# Patient Record
Sex: FEMALE | Race: WHITE
[De-identification: names, ages, dates, MRNs, and addresses within clinical notes are randomized per-mention and may not be internally consistent; named-entity substitution may affect disease eponyms.]

---

## 2021-06-30 ENCOUNTER — HOSPITAL ENCOUNTER (OUTPATIENT)
Dept: HOSPITAL 79 - US | Age: 56
End: 2021-06-30
Attending: INTERNAL MEDICINE
Payer: COMMERCIAL

## 2021-06-30 DIAGNOSIS — R74.8: Primary | ICD-10-CM

## 2021-06-30 DIAGNOSIS — K76.9: ICD-10-CM

## 2021-07-12 ENCOUNTER — HOSPITAL ENCOUNTER (EMERGENCY)
Dept: HOSPITAL 79 - ER1 | Age: 56
Discharge: HOME | End: 2021-07-12
Payer: COMMERCIAL

## 2021-07-12 DIAGNOSIS — R10.9: Primary | ICD-10-CM

## 2021-07-12 DIAGNOSIS — Z91.041: ICD-10-CM

## 2021-07-12 DIAGNOSIS — Z90.49: ICD-10-CM

## 2021-07-12 DIAGNOSIS — I10: ICD-10-CM

## 2021-07-12 DIAGNOSIS — Z90.710: ICD-10-CM

## 2021-07-12 LAB
BUN/CREATININE RATIO: 12 (ref 0–10)
HGB BLD-MCNC: 14.4 GM/DL (ref 12.3–15.3)
RED BLOOD COUNT: 5.11 M/UL (ref 4–5.1)
WHITE BLOOD COUNT: 16.6 K/UL (ref 4.5–11)

## 2021-07-28 ENCOUNTER — HOSPITAL ENCOUNTER (OUTPATIENT)
Dept: HOSPITAL 79 - OR | Age: 56
Discharge: HOME | End: 2021-07-28
Attending: INTERNAL MEDICINE
Payer: COMMERCIAL

## 2021-07-28 DIAGNOSIS — R19.7: ICD-10-CM

## 2021-07-28 DIAGNOSIS — Z79.899: ICD-10-CM

## 2021-07-28 DIAGNOSIS — K29.50: ICD-10-CM

## 2021-07-28 DIAGNOSIS — E11.9: ICD-10-CM

## 2021-07-28 DIAGNOSIS — E66.3: ICD-10-CM

## 2021-07-28 DIAGNOSIS — K64.0: ICD-10-CM

## 2021-07-28 DIAGNOSIS — Z90.710: ICD-10-CM

## 2021-07-28 DIAGNOSIS — K22.70: ICD-10-CM

## 2021-07-28 DIAGNOSIS — Z87.442: ICD-10-CM

## 2021-07-28 DIAGNOSIS — D68.9: ICD-10-CM

## 2021-07-28 DIAGNOSIS — G25.81: ICD-10-CM

## 2021-07-28 DIAGNOSIS — Z20.822: ICD-10-CM

## 2021-07-28 DIAGNOSIS — I10: ICD-10-CM

## 2021-07-28 DIAGNOSIS — K31.9: Primary | ICD-10-CM

## 2021-07-28 DIAGNOSIS — K25.9: ICD-10-CM

## 2021-07-28 DIAGNOSIS — K63.89: ICD-10-CM

## 2021-07-28 DIAGNOSIS — R91.1: ICD-10-CM

## 2021-07-28 DIAGNOSIS — J30.9: ICD-10-CM

## 2021-07-28 DIAGNOSIS — M81.0: ICD-10-CM

## 2021-07-28 DIAGNOSIS — Z91.041: ICD-10-CM

## 2021-07-28 DIAGNOSIS — K21.00: ICD-10-CM

## 2021-07-30 ENCOUNTER — HOSPITAL ENCOUNTER (OUTPATIENT)
Dept: HOSPITAL 79 - CT | Age: 56
End: 2021-07-30
Attending: INTERNAL MEDICINE
Payer: COMMERCIAL

## 2021-07-30 DIAGNOSIS — K76.0: ICD-10-CM

## 2021-07-30 DIAGNOSIS — K76.9: Primary | ICD-10-CM

## 2021-12-16 ENCOUNTER — TELEPHONE (OUTPATIENT)
Dept: NEUROLOGY | Facility: OTHER | Age: 56
End: 2021-12-16

## 2021-12-16 NOTE — TELEPHONE ENCOUNTER
GRACIE CALLED FOR BOTOX DELIVERY AND CLARIFICATION.    CALL TRANSFERRED TO DONALDO ON CLINICAL LINE

## 2022-02-08 ENCOUNTER — HOSPITAL ENCOUNTER (OUTPATIENT)
Dept: HOSPITAL 79 - KOH-I | Age: 57
End: 2022-02-08
Attending: INTERNAL MEDICINE
Payer: COMMERCIAL

## 2022-02-08 DIAGNOSIS — R22.9: Primary | ICD-10-CM

## 2022-03-24 ENCOUNTER — HOSPITAL ENCOUNTER (OUTPATIENT)
Dept: HOSPITAL 79 - OPSV | Age: 57
End: 2022-03-24
Attending: INTERNAL MEDICINE
Payer: COMMERCIAL

## 2022-03-24 VITALS — BODY MASS INDEX: 29.99 KG/M2 | HEIGHT: 65 IN | WEIGHT: 180 LBS

## 2022-03-24 DIAGNOSIS — M81.0: Primary | ICD-10-CM

## 2022-04-26 ENCOUNTER — HOSPITAL ENCOUNTER (OUTPATIENT)
Dept: HOSPITAL 79 - OR | Age: 57
Discharge: HOME | End: 2022-04-26
Attending: INTERNAL MEDICINE
Payer: COMMERCIAL

## 2022-04-26 DIAGNOSIS — Z91.041: ICD-10-CM

## 2022-04-26 DIAGNOSIS — K29.50: ICD-10-CM

## 2022-04-26 DIAGNOSIS — K90.0: Primary | ICD-10-CM

## 2022-04-26 DIAGNOSIS — K22.89: ICD-10-CM

## 2022-04-26 DIAGNOSIS — E66.3: ICD-10-CM

## 2022-04-26 DIAGNOSIS — K22.70: ICD-10-CM

## 2022-04-26 DIAGNOSIS — Z79.82: ICD-10-CM

## 2022-04-26 DIAGNOSIS — K21.00: ICD-10-CM

## 2022-04-26 DIAGNOSIS — Z79.899: ICD-10-CM

## 2022-04-26 DIAGNOSIS — I10: ICD-10-CM

## 2022-04-26 DIAGNOSIS — Z88.8: ICD-10-CM

## 2022-04-26 DIAGNOSIS — M19.90: ICD-10-CM

## 2022-04-26 DIAGNOSIS — E11.9: ICD-10-CM

## 2022-04-26 DIAGNOSIS — E78.5: ICD-10-CM

## 2022-04-26 DIAGNOSIS — Z90.49: ICD-10-CM

## 2022-06-08 ENCOUNTER — HOSPITAL ENCOUNTER (OUTPATIENT)
Dept: HOSPITAL 79 - NM | Age: 57
End: 2022-06-08
Attending: INTERNAL MEDICINE
Payer: COMMERCIAL

## 2022-06-08 DIAGNOSIS — K31.89: Primary | ICD-10-CM

## 2022-06-08 PROCEDURE — A9541 TC99M SULFUR COLLOID: HCPCS

## 2023-10-04 ENCOUNTER — DISEASE STATE MANAGEMENT VISIT (OUTPATIENT)
Dept: PHARMACY | Facility: HOSPITAL | Age: 58
End: 2023-10-04
Payer: MEDICAID

## 2023-10-04 VITALS
DIASTOLIC BLOOD PRESSURE: 74 MMHG | OXYGEN SATURATION: 97 % | TEMPERATURE: 97.3 F | HEIGHT: 67 IN | SYSTOLIC BLOOD PRESSURE: 118 MMHG | HEART RATE: 84 BPM | WEIGHT: 176 LBS | BODY MASS INDEX: 27.62 KG/M2

## 2023-10-04 DIAGNOSIS — G43.719 INTRACTABLE CHRONIC MIGRAINE WITHOUT AURA AND WITHOUT STATUS MIGRAINOSUS: Primary | ICD-10-CM

## 2023-10-04 PROCEDURE — 25010000002 ONABOTULINUMTOXINA 200 UNITS RECONSTITUTED SOLUTION: Performed by: NURSE PRACTITIONER

## 2023-10-04 RX ORDER — GABAPENTIN 400 MG/1
400 CAPSULE ORAL
COMMUNITY

## 2023-10-04 RX ADMIN — ONABOTULINUMTOXINA 165 UNITS: 200 INJECTION, POWDER, LYOPHILIZED, FOR SOLUTION INTRADERMAL; INTRAMUSCULAR at 14:21

## 2023-10-04 NOTE — PROGRESS NOTES
Botox Procedure Note       Patient Name: Esme Franklin  : 1965   MRN: [id number]     Chief Complaint:  Here for Botox injections and the Botox is facility supplied (will be billed by the hospital).    History of Present Illness: Esme Franklin is a 58 y.o. female who is here today for Botox injections for migraines and she has had 90-95% improvement with Botox injections.     We have discussed risk and benefits of this Botox procedure and common side effects including headache, neck pain, neck stiffness or weakness, ptosis, flu-like symptoms as well as more serious possible adverse effects including possible dysphagia, respiratory distress or even death (death has only been reported once with adults for Botox [place] mig[place]ines [place] when mixed with lidocaine solution which we do not use lidocaine solution in our practice for mixing Botox). The patient verbally understands and accepts risks and agrees with moving forward with Botox injections for chronic migraine prevention.    Verbal and written consent has been obtained from the patient prior to beginning treatment injections.     Subjective      Review of Systems:   Review of Systems    The following portions of the patient's history were reviewed an updated as appropriate: past family history, past medical history, past social history, past surgical history.     Medications:     Current Outpatient Medications:     aspirin 81 MG EC tablet, Take 1 tablet by mouth Daily., Disp: , Rfl:     B-D UF III MINI PEN NEEDLES 31G X 5 MM misc, , Disp: , Rfl:     Continuous Blood Gluc Sensor (Dexcom G6 Sensor), AS DIRECTED CHANGE SENSOR EVERY 10 DAYS, Disp: , Rfl:     Continuous Blood Gluc Transmit (Dexcom G6 Transmitter) misc, , Disp: , Rfl:     dilTIAZem (TIAZAC) 180 MG 24 hr capsule, Take 1 capsule by mouth Daily., Disp: , Rfl:     Farxiga 10 MG tablet, , Disp: , Rfl:     folic acid (FOLVITE) 1 MG tablet, Take 1 tablet by mouth Daily., Disp: , Rfl:      "gabapentin (NEURONTIN) 400 MG capsule, Take 1 capsule by mouth every night at bedtime., Disp: , Rfl:     Linzess 72 MCG capsule capsule, , Disp: , Rfl:     lisinopril (PRINIVIL,ZESTRIL) 40 MG tablet, , Disp: , Rfl:     omega-3 acid ethyl esters (LOVAZA) 1 g capsule, , Disp: , Rfl:     pantoprazole (PROTONIX) 40 MG EC tablet, , Disp: , Rfl:     prochlorperazine (COMPAZINE) 5 MG tablet, Take 1 tablet by mouth Every 8 (Eight) Hours As Needed for Nausea or Vomiting., Disp: 30 tablet, Rfl: 5    pyridostigmine (MESTINON) 60 MG tablet, , Disp: , Rfl:     Rimegepant Sulfate (Nurtec) 75 MG tablet dispersible tablet, Place 1 tablet under the tongue Take As Directed., Disp: 16 tablet, Rfl: 11    Rimegepant Sulfate (Nurtec) 75 MG tablet dispersible tablet, Take 1 tablet by mouth As Needed (as needed)., Disp: 6 tablet, Rfl: 0    rOPINIRole (REQUIP) 1 MG tablet, , Disp: , Rfl:     rosuvastatin (CRESTOR) 20 MG tablet, , Disp: , Rfl:     Scopolamine 1 MG/3DAYS patch, , Disp: , Rfl:     Dulaglutide (Trulicity) 4.5 MG/0.5ML solution pen-injector, Inject 4.5 mg under the skin into the appropriate area as directed Every 7 (Seven) Days. (Patient not taking: Reported on 10/4/2023), Disp: , Rfl:     Current Facility-Administered Medications:     OnabotulinumtoxinA 200 Units, 200 Units, Intramuscular, Q3 Months, Sonia Camacho, APRN, 165 Units at 10/04/23 1421    Allergies:   Allergies   Allergen Reactions    Contrast Dye (Echo Or Unknown Ct/Mr) Anaphylaxis    Lyrica [Pregabalin] Anaphylaxis    Influenza Virus Vaccine Unknown - Low Severity     SITE SWELLING AND REDNESS    Other Other (See Comments)    Zonisamide Unknown - High Severity       Objective     Physical Exam:  Vital Signs:   Vitals:    10/04/23 1349   BP: 118/74   BP Location: Right arm   Patient Position: Sitting   Cuff Size: Adult   Pulse: 84   Temp: 97.3 °F (36.3 °C)   SpO2: 97%   Weight: 79.8 kg (176 lb)   Height: 170.2 cm (67\")   PainSc:   4   PainLoc: " Head  Comment: knee     Body mass index is 27.57 kg/m².     Physical Exam    Neurologic Exam    BOTOX PROCEDURE:     Date of Last Injection:   Response: Good  Side Effects: None  : Allerggale  Lot #: [id number]  Expiration Date: 02/2026  NDC: 0817783577    200 unit vial Botox was re-constituted with 4 mL 0.9 NS to 5 unit/0.1 mL using standard techniques.  10 units were given at the  muscle in 2 divided sites  5 units were given at the Procerus muscle  20 units were given at the Frontalis muscle in 4 divided sites  40 units were given at the Temporalis muscle in 8 divided sites  30 units were given at the Occipitalis muscle in 6 divided sites  20 units were given at the Cervical paraspinal muscle in 4 divided sites  30 units were given at the Trapezius muscle in 6 divided sites  10 units were given at the Masseter muscle in 2 divided sites.   For a total of 165 units divided between 33 sites and 35 units of wastage    Patient tolerated procedure well with no immediate complications.     Assessment / Plan      Assessment/Plan:   Diagnoses and all orders for this visit:    1. Intractable chronic migraine without aura and without status migrainosus (Primary)         Follow Up:   It has been determined that Esme Franklin has chronic migraine headaches. We will proceed with a 12 week regimen of 200 units of Botox injections, to treat the patient's chronic migraines.      Return in about 3 months (around 1/4/2024) for Botox.      AROLDO Bustos, FNP-C  [facility]

## 2023-12-26 ENCOUNTER — TELEPHONE (OUTPATIENT)
Dept: NEUROLOGY | Facility: CLINIC | Age: 58
End: 2023-12-26

## 2024-01-10 ENCOUNTER — DISEASE STATE MANAGEMENT VISIT (OUTPATIENT)
Dept: PHARMACY | Facility: HOSPITAL | Age: 59
End: 2024-01-10
Payer: MEDICAID

## 2024-01-10 VITALS
WEIGHT: 178 LBS | HEIGHT: 67 IN | OXYGEN SATURATION: 96 % | BODY MASS INDEX: 27.94 KG/M2 | TEMPERATURE: 97.1 F | HEART RATE: 84 BPM

## 2024-01-10 DIAGNOSIS — G43.719 INTRACTABLE CHRONIC MIGRAINE WITHOUT AURA AND WITHOUT STATUS MIGRAINOSUS: Primary | ICD-10-CM

## 2024-01-10 PROCEDURE — 25010000002 ONABOTULINUMTOXINA 200 UNITS RECONSTITUTED SOLUTION: Performed by: NURSE PRACTITIONER

## 2024-01-10 PROCEDURE — 64615 CHEMODENERV MUSC MIGRAINE: CPT | Performed by: NURSE PRACTITIONER

## 2024-01-10 RX ORDER — TIRZEPATIDE 10 MG/.5ML
INJECTION, SOLUTION SUBCUTANEOUS
COMMUNITY
Start: 2023-12-12

## 2024-01-10 RX ADMIN — ONABOTULINUMTOXINA 165 UNITS: 200 INJECTION, POWDER, LYOPHILIZED, FOR SOLUTION INTRADERMAL; INTRAMUSCULAR at 09:51

## 2024-01-10 NOTE — PROGRESS NOTES
Botox Procedure Note       Patient Name: Esme Santos  : 1965   MRN: [id number]     Chief Complaint:  Here for Botox injections and the Botox is facility supplied (will be billed by the hospital).      History of Present Illness: Esme Santos is a 58 y.o. female who is here today for Botox injections for migraines and has had 100% improvement in migraines with Botox injections.     We have discussed risk and benefits of this Botox procedure and common side effects including headache, neck pain, neck stiffness or weakness, ptosis, flu-like symptoms as well as more serious possible adverse effects including possible dysphagia, respiratory distress or even death (death has only been reported once with adults for Botox for mig[place]ines [place] when mixed with lidocaine solution which we do not use lidocaine solution in our practice for mixing Botox). The patient verbally understands and accepts risks and agrees with moving forward with Botox injections for chronic migraine prevention.    Verbal and written consent has been obtained from the patient prior to beginning treatment injections.     Subjective      Review of Systems:   Review of Systems    The following portions of the patient's history were reviewed an updated as appropriate: past family history, past medical history, past social history, past surgical history.     Medications:     Current Outpatient Medications:     aspirin 81 MG EC tablet, Take 1 tablet by mouth Daily., Disp: , Rfl:     B-D UF III MINI PEN NEEDLES 31G X 5 MM misc, , Disp: , Rfl:     Continuous Blood Gluc Sensor (Dexcom G6 Sensor), AS DIRECTED CHANGE SENSOR EVERY 10 DAYS, Disp: , Rfl:     Continuous Blood Gluc Transmit (Dexcom G6 Transmitter) misc, , Disp: , Rfl:     dilTIAZem (TIAZAC) 180 MG 24 hr capsule, Take 1 capsule by mouth Daily., Disp: , Rfl:     Farxiga 10 MG tablet, , Disp: , Rfl:     folic acid (FOLVITE) 1 MG tablet, Take 1 tablet by mouth Daily., Disp: ,  "Rfl:     gabapentin (NEURONTIN) 400 MG capsule, Take 1 capsule by mouth every night at bedtime., Disp: , Rfl:     Linzess 72 MCG capsule capsule, , Disp: , Rfl:     lisinopril (PRINIVIL,ZESTRIL) 40 MG tablet, , Disp: , Rfl:     Mounjaro 10 MG/0.5ML solution pen-injector pen, , Disp: , Rfl:     omega-3 acid ethyl esters (LOVAZA) 1 g capsule, , Disp: , Rfl:     pantoprazole (PROTONIX) 40 MG EC tablet, , Disp: , Rfl:     prochlorperazine (COMPAZINE) 5 MG tablet, Take 1 tablet by mouth Every 8 (Eight) Hours As Needed for Nausea or Vomiting., Disp: 30 tablet, Rfl: 5    pyridostigmine (MESTINON) 60 MG tablet, , Disp: , Rfl:     Rimegepant Sulfate (Nurtec) 75 MG tablet dispersible tablet, Place 1 tablet under the tongue Take As Directed., Disp: 16 tablet, Rfl: 11    Rimegepant Sulfate (Nurtec) 75 MG tablet dispersible tablet, Take 1 tablet by mouth As Needed (as needed)., Disp: 6 tablet, Rfl: 0    rOPINIRole (REQUIP) 1 MG tablet, , Disp: , Rfl:     rosuvastatin (CRESTOR) 20 MG tablet, , Disp: , Rfl:     Scopolamine 1 MG/3DAYS patch, , Disp: , Rfl:     Current Facility-Administered Medications:     OnabotulinumtoxinA 200 Units, 200 Units, Intramuscular, Q3 Months, Sonia Camacho, AROLDO, 165 Units at 01/10/24 0951    Allergies:   Allergies   Allergen Reactions    Contrast Dye (Echo Or Unknown Ct/Mr) Anaphylaxis    Lyrica [Pregabalin] Anaphylaxis    Influenza Virus Vaccine Unknown - Low Severity     SITE SWELLING AND REDNESS    Other Other (See Comments)    Zonisamide Unknown - High Severity       Objective     Physical Exam:  Vital Signs:   Vitals:    01/10/24 0939   Pulse: 84   Temp: 97.1 °F (36.2 °C)   SpO2: 96%   Weight: 80.7 kg (178 lb)   Height: 170.2 cm (67\")   PainSc: 0-No pain     Body mass index is 27.88 kg/m².     Physical Exam    Neurologic Exam    BOTOX PROCEDURE:     Date of Last Injection:   Response: good  Side Effects: none  : Allergan  Lot #: [id number]  Expiration Date: 05/2026  NDC: " [id number]    200 unit vial Botox was re-constituted with 4 mL 0.9 NS to 5 unit/0.1 mL using standard techniques.  10 units were given at the  muscle in 2 divided sites  5 units were given at the Procerus muscle  20 units were given at the Frontalis muscle in 4 divided sites  40 units were given at the Temporalis muscle in 8 divided sites  30 units were given at the Occipitalis muscle in 6 divided sites  20 units were given at the Cervical paraspinal muscle in 4 divided sites  30 units were given at the Trapezius muscle in 6 divided sites  10 units were given at the Masseter muscle in 2 divided sites.   For a total of 165 units divided between 33 sites and 35 units of wastage    Patient tolerated procedure well with no immediate complications.     Assessment / Plan      Assessment/Plan:   Diagnoses and all orders for this visit:    1. Intractable chronic migraine without aura and without status migrainosus (Primary)         Follow Up:   It has been determined that Esme Santos has chronic migraine headaches. We will proceed with a 12 week regimen of 200 units of Botox injections, to treat the patient's chronic migraines.      Return in about 3 months (around 4/10/2024) for Botox.      AROLDO Bustos, FNP-C  [facility]

## 2024-04-03 ENCOUNTER — DISEASE STATE MANAGEMENT VISIT (OUTPATIENT)
Dept: PHARMACY | Facility: HOSPITAL | Age: 59
End: 2024-04-03
Payer: MEDICAID

## 2024-04-03 VITALS
HEART RATE: 87 BPM | OXYGEN SATURATION: 96 % | HEIGHT: 67 IN | SYSTOLIC BLOOD PRESSURE: 147 MMHG | TEMPERATURE: 97.1 F | DIASTOLIC BLOOD PRESSURE: 88 MMHG | WEIGHT: 188 LBS | BODY MASS INDEX: 29.51 KG/M2

## 2024-04-03 DIAGNOSIS — G43.719 INTRACTABLE CHRONIC MIGRAINE WITHOUT AURA AND WITHOUT STATUS MIGRAINOSUS: Primary | ICD-10-CM

## 2024-04-03 DIAGNOSIS — G43.909 ACUTE MIGRAINE: ICD-10-CM

## 2024-04-03 PROCEDURE — 25010000002 ONABOTULINUMTOXINA 200 UNITS RECONSTITUTED SOLUTION: Performed by: NURSE PRACTITIONER

## 2024-04-03 RX ORDER — RIMEGEPANT SULFATE 75 MG/75MG
75 TABLET, ORALLY DISINTEGRATING ORAL DAILY PRN
Qty: 6 TABLET | Refills: 0 | COMMUNITY
Start: 2024-04-03

## 2024-04-03 RX ORDER — RIMEGEPANT SULFATE 75 MG/75MG
75 TABLET, ORALLY DISINTEGRATING ORAL ONCE AS NEEDED
Qty: 10 TABLET | Refills: 11 | Status: SHIPPED | OUTPATIENT
Start: 2024-04-03

## 2024-04-03 RX ADMIN — ONABOTULINUMTOXINA 165 UNITS: 200 INJECTION, POWDER, LYOPHILIZED, FOR SOLUTION INTRADERMAL; INTRAMUSCULAR at 14:49

## 2024-04-03 NOTE — PROGRESS NOTES
Botox Procedure Note       Patient Name: Esme Santos  : 1965   MRN: [id number]     Chief Complaint:  Here for Botox injections and the Botox is facility supplied (will be billed by the hospital).      History of Present Illness: Esme Santos is a 58 y.o. female who is here today for Botox injections for migraines and she has had 100% improvement in her migraines with Botox injections.  She is taking Nurtec 75mg PRN and it works well for her- needs a prescription.     We have discussed risk and benefits of this Botox procedure and common side effects including headache, neck pain, neck stiffness or weakness, ptosis, flu-like symptoms as well as more serious possible adverse effects including possible dysphagia, respiratory distress or even death (death has only been reported once with adults for Botox [place] mig[place]ines [place] when mixed with lidocaine solution which we do not use lidocaine solution in our practice for mixing Botox). The patient verbally understands and accepts risks and agrees with moving forward with Botox injections for chronic migraine prevention.    Verbal and written consent has been obtained from the patient prior to beginning treatment injections.     Subjective      Review of Systems:   Review of Systems    The following portions of the patient's history were reviewed an updated as appropriate: past family history, past medical history, past social history, past surgical history.     Medications:     Current Outpatient Medications:     aspirin 81 MG EC tablet, Take 1 tablet by mouth Daily., Disp: , Rfl:     B-D UF III MINI PEN NEEDLES 31G X 5 MM misc, , Disp: , Rfl:     Continuous Blood Gluc Sensor (Dexcom G6 Sensor), AS DIRECTED CHANGE SENSOR EVERY 10 DAYS, Disp: , Rfl:     Continuous Blood Gluc Transmit (Dexcom G6 Transmitter) misc, , Disp: , Rfl:     dilTIAZem (TIAZAC) 180 MG 24 hr capsule, Take 1 capsule by mouth Daily., Disp: , Rfl:     Farxiga 10 MG tablet, , Disp:  ", Rfl:     folic acid (FOLVITE) 1 MG tablet, Take 1 tablet by mouth Daily., Disp: , Rfl:     gabapentin (NEURONTIN) 400 MG capsule, Take 1 capsule by mouth every night at bedtime., Disp: , Rfl:     Linzess 72 MCG capsule capsule, , Disp: , Rfl:     lisinopril (PRINIVIL,ZESTRIL) 40 MG tablet, , Disp: , Rfl:     Mounjaro 10 MG/0.5ML solution pen-injector pen, , Disp: , Rfl:     omega-3 acid ethyl esters (LOVAZA) 1 g capsule, , Disp: , Rfl:     pantoprazole (PROTONIX) 40 MG EC tablet, , Disp: , Rfl:     prochlorperazine (COMPAZINE) 5 MG tablet, Take 1 tablet by mouth Every 8 (Eight) Hours As Needed for Nausea or Vomiting., Disp: 30 tablet, Rfl: 5    pyridostigmine (MESTINON) 60 MG tablet, , Disp: , Rfl:     Rimegepant Sulfate (Nurtec) 75 MG tablet dispersible tablet, Take 1 tablet by mouth As Needed (as needed)., Disp: 6 tablet, Rfl: 0    Rimegepant Sulfate (Nurtec) 75 MG tablet dispersible tablet, Place 1 tablet under the tongue 1 (One) Time As Needed (migraine)., Disp: 10 tablet, Rfl: 11    rOPINIRole (REQUIP) 1 MG tablet, , Disp: , Rfl:     rosuvastatin (CRESTOR) 20 MG tablet, , Disp: , Rfl:     Scopolamine 1 MG/3DAYS patch, , Disp: , Rfl:     Current Facility-Administered Medications:     OnabotulinumtoxinA 200 Units, 200 Units, Intramuscular, Q3 Months, Sonia Camacho, AROLDO, 165 Units at 01/10/24 0951    Allergies:   Allergies   Allergen Reactions    Contrast Dye (Echo Or Unknown Ct/Mr) Anaphylaxis    Lyrica [Pregabalin] Anaphylaxis    Influenza Virus Vaccine Unknown - Low Severity     SITE SWELLING AND REDNESS    Other Other (See Comments)    Zonisamide Unknown - High Severity       Objective     Physical Exam:  Vital Signs:   Vitals:    04/03/24 1427   BP: 147/88   BP Location: Right arm   Patient Position: Sitting   Cuff Size: Adult   Pulse: 87   Temp: 97.1 °F (36.2 °C)   SpO2: 96%   Weight: 85.3 kg (188 lb)   Height: 170.2 cm (67\")   PainSc: 0-No pain     Body mass index is 29.44 kg/m².     Physical " Exam    Neurologic Exam    BOTOX PROCEDURE:     Date of Last Injection:   Response: Good  Side Effects: None  : Deonna  Lot #: [id number]  Expiration Date: 06/01/2026  NDC: 8518708035    200 unit vial Botox was re-constituted with 4 mL 0.9 NS to 5 unit/0.1 mL using standard techniques.  10 units were given at the  muscle in 2 divided sites  5 units were given at the Procerus muscle  20 units were given at the Frontalis muscle in 4 divided sites  40 units were given at the Temporalis muscle in 8 divided sites  30 units were given at the Occipitalis muscle in 6 divided sites  20 units were given at the Cervical paraspinal muscle in 4 divided sites  30 units were given at the Trapezius muscle in 6 divided sites  10 units were given at the Masseter muscle in 2 divided sites.   For a total of 165 units divided between 33 sites and 35 units of wastage    Patient tolerated procedure well with no immediate complications.     Assessment / Plan      Assessment/Plan:   Diagnoses and all orders for this visit:    1. Intractable chronic migraine without aura and without status migrainosus (Primary)    2. Acute migraine  -     Rimegepant Sulfate (Nurtec) 75 MG tablet dispersible tablet; Place 1 tablet under the tongue 1 (One) Time As Needed (migraine).  Dispense: 10 tablet; Refill: 11         Follow Up:   It has been determined that Esme Santos has chronic migraine headaches. We will proceed with a 12 week regimen of 200 units of Botox injections, to treat the patient's chronic migraines.      Return in about 3 months (around 7/3/2024) for Botox.      AROLDO Bustos, FNP-C  [facility]

## 2024-06-03 ENCOUNTER — OFFICE VISIT (OUTPATIENT)
Dept: NEUROLOGY | Facility: CLINIC | Age: 59
End: 2024-06-03
Payer: MEDICAID

## 2024-06-03 VITALS
OXYGEN SATURATION: 96 % | BODY MASS INDEX: 29.1 KG/M2 | SYSTOLIC BLOOD PRESSURE: 122 MMHG | WEIGHT: 185.4 LBS | DIASTOLIC BLOOD PRESSURE: 84 MMHG | TEMPERATURE: 97.1 F | HEART RATE: 86 BPM | HEIGHT: 67 IN

## 2024-06-03 DIAGNOSIS — G43.719 INTRACTABLE CHRONIC MIGRAINE WITHOUT AURA AND WITHOUT STATUS MIGRAINOSUS: Primary | ICD-10-CM

## 2024-06-03 DIAGNOSIS — G43.009 MIGRAINE WITHOUT AURA AND WITHOUT STATUS MIGRAINOSUS, NOT INTRACTABLE: ICD-10-CM

## 2024-06-03 PROCEDURE — 1159F MED LIST DOCD IN RCRD: CPT | Performed by: NURSE PRACTITIONER

## 2024-06-03 PROCEDURE — 99213 OFFICE O/P EST LOW 20 MIN: CPT | Performed by: NURSE PRACTITIONER

## 2024-06-03 PROCEDURE — 1160F RVW MEDS BY RX/DR IN RCRD: CPT | Performed by: NURSE PRACTITIONER

## 2024-06-03 RX ORDER — RIMEGEPANT SULFATE 75 MG/75MG
75 TABLET, ORALLY DISINTEGRATING ORAL DAILY
Qty: 2 TABLET | Refills: 0 | COMMUNITY
Start: 2024-06-03

## 2024-06-03 RX ORDER — ROPINIROLE 2 MG/1
2 TABLET, FILM COATED ORAL
COMMUNITY
Start: 2024-05-15

## 2024-06-03 RX ORDER — HYDROCODONE BITARTRATE AND ACETAMINOPHEN 5; 325 MG/1; MG/1
1 TABLET ORAL
COMMUNITY
Start: 2024-04-12

## 2024-06-03 RX ORDER — GABAPENTIN 600 MG/1
600 TABLET ORAL DAILY
COMMUNITY
Start: 2024-05-15

## 2024-06-03 RX ORDER — PROMETHAZINE HYDROCHLORIDE 25 MG/1
TABLET ORAL
COMMUNITY
Start: 2024-05-15

## 2024-06-03 RX ORDER — RIMEGEPANT SULFATE 75 MG/75MG
75 TABLET, ORALLY DISINTEGRATING ORAL AS NEEDED
Qty: 9 TABLET | Refills: 11 | Status: SHIPPED | OUTPATIENT
Start: 2024-06-03

## 2024-06-03 RX ORDER — TIRZEPATIDE 15 MG/.5ML
INJECTION, SOLUTION SUBCUTANEOUS
COMMUNITY
Start: 2024-04-12

## 2024-06-03 NOTE — PROGRESS NOTES
"     Follow Up Office Visit      Patient Name: Esme Santos  : 1965   MRN: [id number]     Chief Complaint:    Chief Complaint   Patient presents with    Follow-up     Patient in office to follow up on migraines.        History of Present Illness: Esme Santos is a 59 y.o. female who is here today to follow up with chronic migraines and was last seen on 2020 by AROLDO [facility] neurology.  She is getting Botox injections every 3 months and has had 100% improvement in her migraines with those injections.  She does notice Botox wears off about a week before the next set of injections are due.  She is taking Nurtec 75mg PRN and feels it works well for her.  She takes Migraine Excedrin PRN.  Migraines are on top of the head and described as intense pressure \"Like my head is gonna blow off\", accompanied by nausea and posterior neck pain, accompanied by photophobia and phonophobia, lasting more than 4 hours.  Takes Promethazine 25mg PRN nausea.  She feels her migraines are currently well controlled and would like to continue current medication regimen.   *Previous medications tried for Migraines- Topiramate caused kidney stones, Amitriptyline without improvement, Sumatriptan without improvement, Rizatriptan without improvement, Zonisamide experienced an allergic reaction, Fioricet without much improvement, Compazine.     Following taken from previous visit note:  Esme Franklin is a 54 y.o. female who is here to follow up with Neurology for intractable migraine headaches.  She has been treated for several years and again Botox in 2019.  This was working very well for her, unfortunately she has not under gone Botox injections since 2019 and her migraines have become unmanageable.  She has tried multiple medications previously.  She reports kidney stone formation on Topamax, and no relief with amitriptyline, sumatriptan, Fioricet, zonisamide, and OTC medications.  Unable to take prednisone due to her " diabetes.  She has been experiencing migraine episodes daily.  These are worst in the morning and she awakens with nausea and frequently has vomiting.  She experiences photophobia, phonophobia, and dizziness.  She reports that her blood sugars have been well controlled.  States that she underwent femur surgery with hardware placement approximately 5 weeks ago.  Fever noted on vital signs and upon questioning she endorses malaise and fatigue for the past few days.  She reports that her incision is healing well and denies redness or drainage at incision site.     Subjective      Review of Systems:   Review of Systems   Neurological:  Positive for headache.       I have reviewed and the following portions of the patient's history were updated as appropriate: past family history, past medical history, past social history, past surgical history and problem list.    Medications:     Current Outpatient Medications:     aspirin 81 MG EC tablet, Take 1 tablet by mouth Daily., Disp: , Rfl:     B-D UF III MINI PEN NEEDLES 31G X 5 MM misc, , Disp: , Rfl:     Continuous Blood Gluc Sensor (Dexcom G6 Sensor), AS DIRECTED CHANGE SENSOR EVERY 10 DAYS, Disp: , Rfl:     Continuous Blood Gluc Transmit (Dexcom G6 Transmitter) misc, , Disp: , Rfl:     dilTIAZem (TIAZAC) 180 MG 24 hr capsule, Take 1 capsule by mouth Daily., Disp: , Rfl:     Farxiga 10 MG tablet, , Disp: , Rfl:     folic acid (FOLVITE) 1 MG tablet, Take 1 tablet by mouth Daily., Disp: , Rfl:     gabapentin (NEURONTIN) 400 MG capsule, Take 1 capsule by mouth every night at bedtime., Disp: , Rfl:     gabapentin (NEURONTIN) 600 MG tablet, Take 1 tablet by mouth Daily., Disp: , Rfl:     HYDROcodone-acetaminophen (NORCO) 5-325 MG per tablet, Take 1 tablet by mouth., Disp: , Rfl:     Linzess 72 MCG capsule capsule, , Disp: , Rfl:     lisinopril (PRINIVIL,ZESTRIL) 40 MG tablet, , Disp: , Rfl:     Mounjaro 15 MG/0.5ML solution pen-injector pen, , Disp: , Rfl:     omega-3 acid  "ethyl esters (LOVAZA) 1 g capsule, , Disp: , Rfl:     pantoprazole (PROTONIX) 40 MG EC tablet, , Disp: , Rfl:     promethazine (PHENERGAN) 25 MG tablet, , Disp: , Rfl:     pyridostigmine (MESTINON) 60 MG tablet, , Disp: , Rfl:     rOPINIRole (REQUIP) 2 MG tablet, Take 1 tablet by mouth., Disp: , Rfl:     rosuvastatin (CRESTOR) 20 MG tablet, , Disp: , Rfl:     Scopolamine 1 MG/3DAYS patch, , Disp: , Rfl:     Rimegepant Sulfate (Nurtec) 75 MG tablet dispersible tablet, Take 1 tablet by mouth Daily., Disp: 2 tablet, Rfl: 0    Rimegepant Sulfate (Nurtec) 75 MG tablet dispersible tablet, Take 1 tablet by mouth As Needed (migraine)., Disp: 9 tablet, Rfl: 11    Current Facility-Administered Medications:     OnabotulinumtoxinA 200 Units, 200 Units, Intramuscular, Q3 Months, Sonia Camacho, AROLDO, 165 Units at 04/03/24 1449    Allergies:   Allergies   Allergen Reactions    Contrast Dye (Echo Or Unknown Ct/Mr) Anaphylaxis    Lyrica [Pregabalin] Anaphylaxis    Influenza Virus Vaccine Unknown - Low Severity     SITE SWELLING AND REDNESS    Other Other (See Comments)    Zonisamide Unknown - High Severity       Objective     Physical Exam:  Vital Signs:   Vitals:    06/03/24 1413   BP: 122/84   BP Location: Right arm   Patient Position: Sitting   Cuff Size: Adult   Pulse: 86   Temp: 97.1 °F (36.2 °C)   SpO2: 96%   Weight: 84.1 kg (185 lb 6.4 oz)   Height: 170.2 cm (67\")   PainSc: 0-No pain     Body mass index is 29.04 kg/m².    Physical Exam  Vitals and nursing note reviewed.   Constitutional:       General: She is not in acute distress.     Appearance: Normal appearance. She is well-developed. She is obese. She is not diaphoretic.   HENT:      Head: Normocephalic and atraumatic.   Eyes:      Extraocular Movements: Extraocular movements intact.      Conjunctiva/sclera: Conjunctivae normal.   Pulmonary:      Effort: Pulmonary effort is normal. No respiratory distress.   Musculoskeletal:         General: Normal range of " motion.   Skin:     General: Skin is warm and dry.   Neurological:      General: No focal deficit present.      Mental Status: She is alert and oriented to person, place, and time.      Cranial Nerves: Cranial nerves 2-12 are intact.      Sensory: Sensation is intact.      Motor: Motor function is intact.      Gait: Gait is intact.   Psychiatric:         Mood and Affect: Mood normal.         Behavior: Behavior normal.         Thought Content: Thought content normal.         Judgment: Judgment normal.         Neurologic Exam     Mental Status   Oriented to person, place, and time.     Cranial Nerves   Cranial nerves II through XII intact.     Gait, Coordination, and Reflexes     Gait  Gait: normal       Assessment / Plan      Assessment/Plan:   Diagnoses and all orders for this visit:    1. Intractable chronic migraine without aura and without status migrainosus (Primary)    2. Migraine without aura and without status migrainosus, not intractable  -     Rimegepant Sulfate (Nurtec) 75 MG tablet dispersible tablet; Take 1 tablet by mouth Daily.  Dispense: 2 tablet; Refill: 0  -     Rimegepant Sulfate (Nurtec) 75 MG tablet dispersible tablet; Take 1 tablet by mouth As Needed (migraine).  Dispense: 9 tablet; Refill: 11    3. BMI 29.0-29.9,adult    *Patient education on Migraines provided today.      Follow Up:   Return in about 1 year (around 6/3/2025) for Follow Up.    AROLDO Bustos, FNP-C  [facility]

## 2024-07-03 ENCOUNTER — DISEASE STATE MANAGEMENT VISIT (OUTPATIENT)
Dept: PHARMACY | Facility: HOSPITAL | Age: 59
End: 2024-07-03
Payer: MEDICAID

## 2024-07-03 VITALS
OXYGEN SATURATION: 97 % | HEART RATE: 89 BPM | HEIGHT: 67 IN | TEMPERATURE: 97.1 F | SYSTOLIC BLOOD PRESSURE: 153 MMHG | BODY MASS INDEX: 29.66 KG/M2 | WEIGHT: 189 LBS | DIASTOLIC BLOOD PRESSURE: 86 MMHG

## 2024-07-03 DIAGNOSIS — G43.719 INTRACTABLE CHRONIC MIGRAINE WITHOUT AURA AND WITHOUT STATUS MIGRAINOSUS: Primary | ICD-10-CM

## 2024-07-03 PROCEDURE — 64615 CHEMODENERV MUSC MIGRAINE: CPT | Performed by: NURSE PRACTITIONER

## 2024-07-03 PROCEDURE — 25010000002 ONABOTULINUMTOXINA 200 UNITS RECONSTITUTED SOLUTION: Performed by: NURSE PRACTITIONER

## 2024-07-03 RX ADMIN — ONABOTULINUMTOXINA 165 UNITS: 200 INJECTION, POWDER, LYOPHILIZED, FOR SOLUTION INTRADERMAL; INTRAMUSCULAR at 13:53

## 2024-07-03 NOTE — PROGRESS NOTES
Botox Procedure Note       Patient Name: Esme Santos  : 1965   MRN: [id number]     Chief Complaint:  Here for Botox injections and the Botox is facility supplied (will be billed by the hospital).      History of Present Illness: Esme Santos is a 59 y.o. female who is here today for Botox injections for migraines and she has had 99% improvement in her migraines with Botox injections.     We have discussed risk and benefits of this Botox procedure and common side effects including headache, neck pain, neck stiffness or weakness, ptosis, flu-like symptoms as well as more serious possible adverse effects including possible dysphagia, respiratory distress or even death (death has only been reported once with adults for Botox [place] mig[place]ines [place] when mixed with lidocaine solution which we do not use lidocaine solution in our practice for mixing Botox). The patient verbally understands and accepts risks and agrees with moving forward with Botox injections for chronic migraine prevention.    Verbal and written consent has been obtained from the patient prior to beginning treatment injections.     Subjective      Review of Systems:   Review of Systems    The following portions of the patient's history were reviewed an updated as appropriate: past family history, past medical history, past social history, past surgical history.     Medications:     Current Outpatient Medications:     aspirin 81 MG EC tablet, Take 1 tablet by mouth Daily., Disp: , Rfl:     B-D UF III MINI PEN NEEDLES 31G X 5 MM misc, , Disp: , Rfl:     Continuous Blood Gluc Sensor (Dexcom G6 Sensor), AS DIRECTED CHANGE SENSOR EVERY 10 DAYS, Disp: , Rfl:     Continuous Blood Gluc Transmit (Dexcom G6 Transmitter) misc, , Disp: , Rfl:     dilTIAZem (TIAZAC) 180 MG 24 hr capsule, Take 1 capsule by mouth Daily., Disp: , Rfl:     Farxiga 10 MG tablet, , Disp: , Rfl:     folic acid (FOLVITE) 1 MG tablet, Take 1 tablet by mouth Daily.,  "Disp: , Rfl:     gabapentin (NEURONTIN) 400 MG capsule, Take 1 capsule by mouth every night at bedtime., Disp: , Rfl:     gabapentin (NEURONTIN) 600 MG tablet, Take 1 tablet by mouth Daily., Disp: , Rfl:     HYDROcodone-acetaminophen (NORCO) 5-325 MG per tablet, Take 1 tablet by mouth., Disp: , Rfl:     Linzess 72 MCG capsule capsule, , Disp: , Rfl:     lisinopril (PRINIVIL,ZESTRIL) 40 MG tablet, , Disp: , Rfl:     Mounjaro 15 MG/0.5ML solution pen-injector pen, , Disp: , Rfl:     omega-3 acid ethyl esters (LOVAZA) 1 g capsule, , Disp: , Rfl:     pantoprazole (PROTONIX) 40 MG EC tablet, , Disp: , Rfl:     promethazine (PHENERGAN) 25 MG tablet, , Disp: , Rfl:     pyridostigmine (MESTINON) 60 MG tablet, , Disp: , Rfl:     Rimegepant Sulfate (Nurtec) 75 MG tablet dispersible tablet, Take 1 tablet by mouth Daily., Disp: 2 tablet, Rfl: 0    Rimegepant Sulfate (Nurtec) 75 MG tablet dispersible tablet, Take 1 tablet by mouth As Needed (migraine)., Disp: 9 tablet, Rfl: 11    rOPINIRole (REQUIP) 2 MG tablet, Take 1 tablet by mouth., Disp: , Rfl:     rosuvastatin (CRESTOR) 20 MG tablet, , Disp: , Rfl:     Scopolamine 1 MG/3DAYS patch, , Disp: , Rfl:     Current Facility-Administered Medications:     OnabotulinumtoxinA 200 Units, 200 Units, Intramuscular, Q3 Months, Sonia Camacho, APRN, 165 Units at 04/03/24 1449    Allergies:   Allergies   Allergen Reactions    Contrast Dye (Echo Or Unknown Ct/Mr) Anaphylaxis    Lyrica [Pregabalin] Anaphylaxis    Influenza Virus Vaccine Unknown - Low Severity     SITE SWELLING AND REDNESS    Other Other (See Comments)    Zonisamide Unknown - High Severity       Objective     Physical Exam:  Vital Signs:   Vitals:    07/03/24 1334   BP: 153/86   BP Location: Right arm   Patient Position: Sitting   Cuff Size: Adult   Pulse: 89   Temp: 97.1 °F (36.2 °C)   SpO2: 97%   Weight: 85.7 kg (189 lb)   Height: 170.2 cm (67\")   PainSc: 0-No pain     Body mass index is 29.6 kg/m².     Physical " Exam    Neurologic Exam    BOTOX PROCEDURE:     Date of Last Injection:   Response: Good  Side Effects: None  : [employer] System[employer]ale  Lot #: [id number]  Expiration Date: 06/2026  NDC: 5845998954    200 unit vial Botox was re-constituted with 4 mL 0.9 NS to 5 unit/0.1 mL using standard techniques.  10 units were given at the  muscle in 2 divided sites  5 units were given at the Procerus muscle  20 units were given at the Frontalis muscle in 4 divided sites  40 units were given at the Temporalis muscle in 8 divided sites  30 units were given at the Occipitalis muscle in 6 divided sites  20 units were given at the Cervical paraspinal muscle in 4 divided sites  30 units were given at the Trapezius muscle in 6 divided sites  10 units were given at the Masseter muscle in 2 divided sites.   For a total of 165 units divided between 33 sites and 35 units of wastage    Patient tolerated procedure well with no immediate complications.     Assessment / Plan      Assessment/Plan:   Diagnoses and all orders for this visit:    1. Intractable chronic migraine without aura and without status migrainosus (Primary)         Follow Up:   It has been determined that Esme Santos has chronic migraine headaches. We will proceed with a 12 week regimen of 200 units of Botox injections, to treat the patient's chronic migraines.      Return in about 3 months (around 10/3/2024) for Botox.      AROLDO Bustos, FNP-C  [facility] Neurology and Sleep Medicine

## 2024-10-02 ENCOUNTER — DISEASE STATE MANAGEMENT VISIT (OUTPATIENT)
Dept: PHARMACY | Facility: HOSPITAL | Age: 59
End: 2024-10-02
Payer: MEDICAID

## 2024-10-02 VITALS
OXYGEN SATURATION: 97 % | WEIGHT: 188.4 LBS | HEIGHT: 67 IN | DIASTOLIC BLOOD PRESSURE: 72 MMHG | HEART RATE: 79 BPM | TEMPERATURE: 97.3 F | BODY MASS INDEX: 29.57 KG/M2 | SYSTOLIC BLOOD PRESSURE: 119 MMHG

## 2024-10-02 DIAGNOSIS — G43.719 INTRACTABLE CHRONIC MIGRAINE WITHOUT AURA AND WITHOUT STATUS MIGRAINOSUS: Primary | ICD-10-CM

## 2024-10-02 PROCEDURE — 25010000002 ONABOTULINUMTOXINA 200 UNITS RECONSTITUTED SOLUTION: Performed by: NURSE PRACTITIONER

## 2024-10-02 RX ORDER — DULAGLUTIDE 3 MG/.5ML
0.5 INJECTION, SOLUTION SUBCUTANEOUS
COMMUNITY
Start: 2024-09-12

## 2024-10-02 RX ADMIN — ONABOTULINUMTOXINA 165 UNITS: 200 INJECTION, POWDER, LYOPHILIZED, FOR SOLUTION INTRADERMAL; INTRAMUSCULAR at 14:02

## 2024-10-02 NOTE — PROGRESS NOTES
Botox Procedure Note       Patient Name: Esme Santos  : 1965   MRN: [id number]     Chief Complaint:  Here for Botox injections and the Botox is facility supplied (will be billed by the hospital).      History of Present Illness: Esme Santos is a 59 y.o. female who is here today for Botox injections for migraines and she has had 99% improvement in her migraines with Botox injections.     We have discussed risk and benefits of this Botox procedure and common side effects including headache, neck pain, neck stiffness or weakness, ptosis, flu-like symptoms as well as more serious possible adverse effects including possible dysphagia, respiratory distress or even death (death has only been reported once with adults for Botox [place] migraines [place] when mixed with lidocaine solution which we do not use lidocaine solution in our practice for mixing Botox). The patient verbally understands and accepts risks and agrees with moving forward with Botox injections for chronic migraine prevention.    Verbal and written consent has been obtained from the patient prior to beginning treatment injections.     Subjective      Review of Systems:   Review of Systems    The following portions of the patient's history were reviewed an updated as appropriate: past family history, past medical history, past social history, past surgical history.     Medications:     Current Outpatient Medications:     aspirin 81 MG EC tablet, Take 1 tablet by mouth Daily., Disp: , Rfl:     B-D UF III MINI PEN NEEDLES 31G X 5 MM misc, , Disp: , Rfl:     Continuous Blood Gluc Sensor (Dexcom G6 Sensor), AS DIRECTED CHANGE SENSOR EVERY 10 DAYS, Disp: , Rfl:     Continuous Blood Gluc Transmit (Dexcom G6 Transmitter) misc, , Disp: , Rfl:     dilTIAZem (TIAZAC) 180 MG 24 hr capsule, Take 1 capsule by mouth Daily., Disp: , Rfl:     Farxiga 10 MG tablet, , Disp: , Rfl:     folic acid (FOLVITE) 1 MG tablet, Take 1 tablet by mouth Daily.,  Disp: , Rfl:     gabapentin (NEURONTIN) 400 MG capsule, Take 1 capsule by mouth every night at bedtime., Disp: , Rfl:     gabapentin (NEURONTIN) 600 MG tablet, Take 1 tablet by mouth Daily., Disp: , Rfl:     HYDROcodone-acetaminophen (NORCO) 5-325 MG per tablet, Take 1 tablet by mouth., Disp: , Rfl:     Linzess 72 MCG capsule capsule, , Disp: , Rfl:     lisinopril (PRINIVIL,ZESTRIL) 40 MG tablet, , Disp: , Rfl:     Mounjaro 15 MG/0.5ML solution pen-injector pen, , Disp: , Rfl:     omega-3 acid ethyl esters (LOVAZA) 1 g capsule, , Disp: , Rfl:     pantoprazole (PROTONIX) 40 MG EC tablet, , Disp: , Rfl:     promethazine (PHENERGAN) 25 MG tablet, , Disp: , Rfl:     pyridostigmine (MESTINON) 60 MG tablet, , Disp: , Rfl:     Rimegepant Sulfate (Nurtec) 75 MG tablet dispersible tablet, Take 1 tablet by mouth Daily., Disp: 2 tablet, Rfl: 0    Rimegepant Sulfate (Nurtec) 75 MG tablet dispersible tablet, Take 1 tablet by mouth As Needed (migraine)., Disp: 9 tablet, Rfl: 11    rOPINIRole (REQUIP) 2 MG tablet, Take 1 tablet by mouth., Disp: , Rfl:     rosuvastatin (CRESTOR) 20 MG tablet, , Disp: , Rfl:     Scopolamine 1 MG/3DAYS patch, , Disp: , Rfl:     Trulicity 3 MG/0.5ML solution pen-injector, Inject 0.5 mL under the skin into the appropriate area as directed., Disp: , Rfl:     Current Facility-Administered Medications:     OnabotulinumtoxinA 200 Units, 200 Units, Intramuscular, Q3 Months, Sonia Camacho, AROLDO, 165 Units at 10/02/24 1402    Allergies:   Allergies   Allergen Reactions    Contrast Dye (Echo Or Unknown Ct/Mr) Anaphylaxis    Lyrica [Pregabalin] Anaphylaxis    Influenza Virus Vaccine Unknown - Low Severity     SITE SWELLING AND REDNESS    Other Other (See Comments)    Zonisamide Unknown - High Severity       Objective     Physical Exam:  Vital Signs:   Vitals:    10/02/24 1342   BP: 119/72   BP Location: Right arm   Patient Position: Sitting   Cuff Size: Adult   Pulse: 79   Temp: 97.3 °F (36.3 °C)  "  SpO2: 97%   Weight: 85.5 kg (188 lb 6.4 oz)   Height: 170.2 cm (67\")   PainSc: 0-No pain     Body mass index is 29.51 kg/m².     Physical Exam    Neurologic Exam    BOTOX PROCEDURE:     Date of Last Injection:   Response: Good  Side Effects: None  : [employer]  Lot #: [id number]  Expiration Date: 11/2026  NDC: 2726090797    200 unit vial Botox was re-constituted with 4 mL 0.9 NS to 5 unit/0.1 mL using standard techniques.  10 units were given at the  muscle in 2 divided sites  5 units were given at the Procerus muscle  20 units were given at the Frontalis muscle in 4 divided sites  40 units were given at the Temporalis muscle in 8 divided sites  30 units were given at the Occipitalis muscle in 6 divided sites  20 units were given at the Cervical paraspinal muscle in 4 divided sites  30 units were given at the Trapezius muscle in 6 divided sites  10 units were given at the Masseter muscle in 2 divided sites.   For a total of 165 units divided between 33 sites and 35 units of wastage    Patient tolerated procedure well with no immediate complications.     Assessment / Plan      Assessment/Plan:   Diagnoses and all orders for this visit:    1. Intractable chronic migraine without aura and without status migrainosus (Primary)         Follow Up:   It has been determined that Esme Santos has chronic migraine headaches. We will proceed with a 12 week regimen of 200 units of Botox injections, to treat the patient's chronic migraines.      Return in about 3 months (around 1/2/2025) for Botox.      AROLDO Bustos, FNP-C  [facility] Neurology and Sleep Medicine   "

## 2025-01-08 ENCOUNTER — DISEASE STATE MANAGEMENT VISIT (OUTPATIENT)
Dept: PHARMACY | Facility: HOSPITAL | Age: 60
End: 2025-01-08
Payer: MEDICAID

## 2025-01-08 VITALS
SYSTOLIC BLOOD PRESSURE: 148 MMHG | HEART RATE: 86 BPM | OXYGEN SATURATION: 96 % | DIASTOLIC BLOOD PRESSURE: 90 MMHG | BODY MASS INDEX: 29.03 KG/M2 | HEIGHT: 67 IN | TEMPERATURE: 97.8 F | WEIGHT: 185 LBS

## 2025-01-08 DIAGNOSIS — G43.719 INTRACTABLE CHRONIC MIGRAINE WITHOUT AURA AND WITHOUT STATUS MIGRAINOSUS: Primary | ICD-10-CM

## 2025-01-08 PROCEDURE — 25010000002 ONABOTULINUMTOXINA 200 UNITS RECONSTITUTED SOLUTION: Performed by: NURSE PRACTITIONER

## 2025-01-08 RX ORDER — PREDNISONE 10 MG/1
TABLET ORAL
COMMUNITY
Start: 2025-01-06

## 2025-01-08 RX ORDER — DULAGLUTIDE 4.5 MG/.5ML
0.5 INJECTION, SOLUTION SUBCUTANEOUS
COMMUNITY
Start: 2024-12-06

## 2025-01-08 RX ORDER — OXYCODONE HYDROCHLORIDE 5 MG/1
1 TABLET ORAL
COMMUNITY

## 2025-01-08 RX ORDER — HYDROCODONE BITARTRATE AND ACETAMINOPHEN 5; 325 MG/1; MG/1
1 TABLET ORAL
COMMUNITY
Start: 2025-01-06

## 2025-01-08 RX ORDER — ONABOTULINUMTOXINA 200 [USP'U]/1
INJECTION, POWDER, LYOPHILIZED, FOR SOLUTION INTRADERMAL; INTRAMUSCULAR
COMMUNITY

## 2025-01-08 RX ADMIN — ONABOTULINUMTOXINA 165 UNITS: 200 INJECTION, POWDER, LYOPHILIZED, FOR SOLUTION INTRADERMAL; INTRAMUSCULAR at 14:49

## 2025-01-08 NOTE — PROGRESS NOTES
Botox Procedure Note       Patient Name: Esme Santos  : 1965   MRN: [id number]     Chief Complaint:  Here for Botox injections and the Botox is facility supplied (will be billed by the hospital).      History of Present Illness: Esme Santos is a 59 y.o. female who is here today for Botox injections for migraines and she has had 95% improvement in her migraines with Botox injections.     We have discussed risk and benefits of this Botox procedure and common side effects including headache, neck pain, neck stiffness or weakness, ptosis, flu-like symptoms as well as more serious possible adverse effects including possible dysphagia, respiratory distress or even death (death has only been reported once with adults for Botox [place]ines [place] when mixed with lidocaine solution which we do not use lidocaine solution in our practice for mixing Botox). The patient verbally understands and accepts risks and agrees with moving forward with Botox injections for chronic migraine prevention.    Verbal and written consent has been obtained from the patient prior to beginning treatment injections.     Subjective      Review of Systems:   Review of Systems    The following portions of the patient's history were reviewed an updated as appropriate: past family history, past medical history, past social history, past surgical history.     Medications:     Current Outpatient Medications:     aspirin 81 MG EC tablet, Take 1 tablet by mouth Daily., Disp: , Rfl:     B-D UF III MINI PEN NEEDLES 31G X 5 MM misc, , Disp: , Rfl:     Continuous Blood Gluc Sensor (Dexcom G6 Sensor), AS DIRECTED CHANGE SENSOR EVERY 10 DAYS, Disp: , Rfl:     Continuous Blood Gluc Transmit (Dexcom G6 Transmitter) misc, , Disp: , Rfl:     dilTIAZem (TIAZAC) 180 MG 24 hr capsule, Take 1 capsule by mouth Daily., Disp: , Rfl:     Farxiga 10 MG tablet, , Disp: , Rfl:     folic acid (FOLVITE) 1 MG tablet, Take 1 tablet by mouth Daily.,  Disp: , Rfl:     gabapentin (NEURONTIN) 400 MG capsule, Take 1 capsule by mouth every night at bedtime., Disp: , Rfl:     gabapentin (NEURONTIN) 600 MG tablet, Take 1 tablet by mouth Daily., Disp: , Rfl:     HYDROcodone-acetaminophen (NORCO) 5-325 MG per tablet, Take 1 tablet by mouth every night at bedtime., Disp: , Rfl:     Linzess 72 MCG capsule capsule, , Disp: , Rfl:     lisinopril (PRINIVIL,ZESTRIL) 40 MG tablet, , Disp: , Rfl:     Mounjaro 15 MG/0.5ML solution pen-injector pen, , Disp: , Rfl:     omega-3 acid ethyl esters (LOVAZA) 1 g capsule, , Disp: , Rfl:     OnabotulinumtoxinA (Botox) 200 units reconstituted solution, Inject  into the appropriate area of the skin as directed by provider., Disp: , Rfl:     oxyCODONE (ROXICODONE) 5 MG immediate release tablet, Take 1 tablet by mouth every night at bedtime., Disp: , Rfl:     pantoprazole (PROTONIX) 40 MG EC tablet, , Disp: , Rfl:     predniSONE (DELTASONE) 10 MG tablet, Take as medication prior to CT scan - take 50 mg (5 tablets) po 15 hours prior to CT scan, then 50 mg (5 tablets) po 7 hours prior to exam, and then 50 mg (5 tablets) po 1 hour prior to exam, Disp: , Rfl:     promethazine (PHENERGAN) 25 MG tablet, , Disp: , Rfl:     pyridostigmine (MESTINON) 60 MG tablet, , Disp: , Rfl:     Rimegepant Sulfate (Nurtec) 75 MG tablet dispersible tablet, Take 1 tablet by mouth Daily., Disp: 2 tablet, Rfl: 0    Rimegepant Sulfate (Nurtec) 75 MG tablet dispersible tablet, Take 1 tablet by mouth As Needed (migraine)., Disp: 9 tablet, Rfl: 11    rOPINIRole (REQUIP) 2 MG tablet, Take 1 tablet by mouth., Disp: , Rfl:     rosuvastatin (CRESTOR) 20 MG tablet, , Disp: , Rfl:     Scopolamine 1 MG/3DAYS patch, , Disp: , Rfl:     Trulicity 4.5 MG/0.5ML solution auto-injector, Inject 0.5 mL under the skin into the appropriate area as directed., Disp: , Rfl:     Current Facility-Administered Medications:     OnabotulinumtoxinA 200 Units, 200 Units, Intramuscular, Q3 Months,  "Sonia Camacho Erica, AROLDO, 165 Units at 10/02/24 1402    Allergies:   Allergies   Allergen Reactions    Contrast Dye (Echo Or Unknown Ct/Mr) Anaphylaxis    Iodinated Contrast Media Anaphylaxis    Lyrica [Pregabalin] Anaphylaxis    Influenza Virus Vaccine Unknown - Low Severity     SITE SWELLING AND REDNESS    Other Other (See Comments)    Zonisamide Unknown - High Severity       Objective     Physical Exam:  Vital Signs:   Vitals:    01/08/25 1422   BP: 148/90   BP Location: Left arm   Patient Position: Sitting   Cuff Size: Adult   Pulse: 86   Temp: 97.8 °F (36.6 °C)   TempSrc: Infrared   SpO2: 96%   Weight: 83.9 kg (185 lb)   Height: 170.2 cm (67\")   PainSc:   6   PainLoc: Head     Body mass index is 28.98 kg/m².     Physical Exam      BOTOX PROCEDURE:     Date of Last Injection:   Response: Good  Side Effects: None  : [employer]  Lot #: [id number]  Expiration Date: 09/2026  NDC: 7235818094    200 unit vial Botox was re-constituted with 4 mL 0.9 NS to 5 unit/0.1 mL using standard techniques.  10 units were given at the  muscle in 2 divided sites  5 units were given at the Procerus muscle  20 units were given at the Frontalis muscle in 4 divided sites  40 units were given at the Temporalis muscle in 8 divided sites  30 units were given at the Occipitalis muscle in 6 divided sites  20 units were given at the Cervical paraspinal muscle in 4 divided sites  30 units were given at the Trapezius muscle in 6 divided sites  10 units were given at the Masseter muscle in 2 divided sites.   For a total of 165 units divided between 33 sites and 35 units of wastage    Patient tolerated procedure well with no immediate complications.     Assessment / Plan      Assessment/Plan:   Diagnoses and all orders for this visit:    1. Intractable chronic migraine without aura and without status migrainosus (Primary)         Follow Up:   It has been determined that Esme Santos has chronic migraine headaches. We will " proceed with a 12 week regimen of 200 units of Botox injections, to treat the patient's chronic migraines.      Return in about 3 months (around 4/8/2025) for Botox.      AROLDO Bustos, FNP-C  [facility] Neurology and Sleep Medicine

## 2025-04-01 ENCOUNTER — TELEPHONE (OUTPATIENT)
Dept: NEUROLOGY | Facility: CLINIC | Age: 60
End: 2025-04-01
Payer: MEDICAID

## 2025-04-01 NOTE — TELEPHONE ENCOUNTER
Provider: AROLDO CALVIN    Caller: Esme Santos    Relationship to Patient: Self    Phone Number: [phone]      Reason for Call: PT STATES SHE IS HAVING A PET SCAN IN THE MORNING BEFORE HER BOTOX INJ, HOWEVER SHE WAS ADVISED TO CONFIRM W/LILY IF IT IS OKAY TO HAVE THE BOTOX AFTER HAVING THE PET SCAN.    When was the patient last seen: 01/08/25      PLEASE REVIEW AND ADVISE PT IF OKAY OR IF BOTOX NEEDS TO BE RESCHEDULED FOR 04/02.    .

## 2025-04-23 ENCOUNTER — DISEASE STATE MANAGEMENT VISIT (OUTPATIENT)
Dept: PHARMACY | Facility: HOSPITAL | Age: 60
End: 2025-04-23
Payer: MEDICAID

## 2025-04-23 VITALS
BODY MASS INDEX: 28.41 KG/M2 | OXYGEN SATURATION: 95 % | WEIGHT: 181 LBS | SYSTOLIC BLOOD PRESSURE: 121 MMHG | TEMPERATURE: 98.4 F | HEIGHT: 67 IN | DIASTOLIC BLOOD PRESSURE: 61 MMHG | HEART RATE: 72 BPM

## 2025-04-23 DIAGNOSIS — G43.009 MIGRAINE WITHOUT AURA AND WITHOUT STATUS MIGRAINOSUS, NOT INTRACTABLE: ICD-10-CM

## 2025-04-23 PROCEDURE — 25010000002 ONABOTULINUMTOXINA 200 UNITS RECONSTITUTED SOLUTION: Performed by: NURSE PRACTITIONER

## 2025-04-23 RX ADMIN — ONABOTULINUMTOXINA 165 UNITS: 200 INJECTION, POWDER, LYOPHILIZED, FOR SOLUTION INTRADERMAL; INTRAMUSCULAR at 14:28

## 2025-04-23 NOTE — PROGRESS NOTES
Botox Procedure Note       Patient Name: Esme Santos  : 1965   MRN: [id number]     Chief Complaint:  Here for Botox injections and the Botox is facility supplied (will be billed by the hospital).      History of Present Illness: Esme Santos is a 60 y.o. female who is here today for Botox injections for migraines and she had 90-99% improvement in her migraines with Botox injections.  Patient says she has just found out she has liver cancer and will be having surgery next month.     We have discussed risk and benefits of this Botox procedure and common side effects including headache, neck pain, neck stiffness or weakness, ptosis, flu-like symptoms as well as more serious possible adverse effects including possible dysphagia, respiratory distress or even death (death has only been reported once with adults for Botox [place] when mixed with lidocaine solution which we do not use lidocaine solution in our practice for mixing Botox). The patient verbally understands and accepts risks and agrees with moving forward with Botox injections for chronic migraine prevention.    Verbal and written consent has been obtained from the patient prior to beginning treatment injections.     Subjective      Review of Systems:   Review of Systems    The following portions of the patient's history were reviewed an updated as appropriate: past family history, past medical history, past social history, past surgical history.     Medications:     Current Outpatient Medications:     aspirin 81 MG EC tablet, Take 1 tablet by mouth Daily., Disp: , Rfl:     B-D UF III MINI PEN NEEDLES 31G X 5 MM misc, , Disp: , Rfl:     Continuous Blood Gluc Sensor (Dexcom G6 Sensor), AS DIRECTED CHANGE SENSOR EVERY 10 DAYS, Disp: , Rfl:     Continuous Blood Gluc Transmit (Dexcom G6 Transmitter) misc, , Disp: , Rfl:     dilTIAZem (TIAZAC) 180 MG 24 hr capsule, Take 1 capsule by mouth Daily., Disp: , Rfl:     Farxiga 10 MG  tablet, , Disp: , Rfl:     folic acid (FOLVITE) 1 MG tablet, Take 1 tablet by mouth Daily., Disp: , Rfl:     gabapentin (NEURONTIN) 600 MG tablet, Take 1 tablet by mouth Daily., Disp: , Rfl:     HYDROcodone-acetaminophen (NORCO) 5-325 MG per tablet, Take 1 tablet by mouth every night at bedtime., Disp: , Rfl:     Linzess 72 MCG capsule capsule, , Disp: , Rfl:     lisinopril (PRINIVIL,ZESTRIL) 40 MG tablet, , Disp: , Rfl:     omega-3 acid ethyl esters (LOVAZA) 1 g capsule, , Disp: , Rfl:     OnabotulinumtoxinA (Botox) 200 units reconstituted solution, Inject  into the appropriate area of the skin as directed by provider., Disp: , Rfl:     oxyCODONE (ROXICODONE) 5 MG immediate release tablet, Take 1 tablet by mouth every night at bedtime., Disp: , Rfl:     pantoprazole (PROTONIX) 40 MG EC tablet, , Disp: , Rfl:     predniSONE (DELTASONE) 10 MG tablet, Take as medication prior to CT scan - take 50 mg (5 tablets) po 15 hours prior to CT scan, then 50 mg (5 tablets) po 7 hours prior to exam, and then 50 mg (5 tablets) po 1 hour prior to exam, Disp: , Rfl:     promethazine (PHENERGAN) 25 MG tablet, , Disp: , Rfl:     pyridostigmine (MESTINON) 60 MG tablet, , Disp: , Rfl:     Rimegepant Sulfate (Nurtec) 75 MG tablet dispersible tablet, Take 1 tablet by mouth Daily., Disp: 2 tablet, Rfl: 0    rimegepant sulfate ODT (Nurtec) 75 MG disintegrating tablet, Place 1 tablet under the tongue As Needed (migraine)., Disp: 9 tablet, Rfl: 11    rOPINIRole (REQUIP) 2 MG tablet, Take 1 tablet by mouth., Disp: , Rfl:     rosuvastatin (CRESTOR) 20 MG tablet, , Disp: , Rfl:     Scopolamine 1 MG/3DAYS patch, , Disp: , Rfl:     Trulicity 4.5 MG/0.5ML solution auto-injector, Inject 0.5 mL under the skin into the appropriate area as directed., Disp: , Rfl:     Current Facility-Administered Medications:     OnabotulinumtoxinA 200 Units, 200 Units, Intramuscular, Q3 Months, Sonia Camacho APRN, 165 Units at 01/08/25 1449    Allergies:  "  Allergies   Allergen Reactions    Contrast Dye (Echo Or Unknown Ct/Mr) Anaphylaxis    Iodinated Contrast Media Anaphylaxis    Lyrica [Pregabalin] Anaphylaxis    Influenza Virus Vaccine Unknown - Low Severity     SITE SWELLING AND REDNESS    Other Other (See Comments)    Zonisamide Unknown - High Severity       Objective     Physical Exam:  Vital Signs:   Vitals:    04/23/25 1404   BP: 121/61   BP Location: Right arm   Patient Position: Sitting   Cuff Size: Adult   Pulse: 72   Temp: 98.4 °F (36.9 °C)   SpO2: 95%   Weight: 82.1 kg (181 lb)   Height: 170.2 cm (67\")   PainSc: 0-No pain     Body mass index is 28.35 kg/m².     Physical Exam      BOTOX PROCEDURE:     Date of Last Injection:   Response: Good  Side Effects: none  : [employer]  Lot #: [id number]  Expiration Date: 10/2027  NDC: 6107752579    200 unit vial Botox was re-constituted with 4 mL 0.9 NS to 5 unit/0.1 mL using standard techniques.  10 units were given at the  muscle in 2 divided sites  5 units were given at the Procerus muscle  20 units were given at the Frontalis muscle in 4 divided sites  40 units were given at the Temporalis muscle in 8 divided sites  30 units were given at the Occipitalis muscle in 6 divided sites  20 units were given at the Cervical paraspinal muscle in 4 divided sites  30 units were given at the Trapezius muscle in 6 divided sites  10 units were given at the Masseter muscle in 2 divided sites.   For a total of 165 units divided between 33 sites and 35 units of wastage    Patient tolerated procedure well with no immediate complications.     Assessment / Plan      Assessment/Plan:   Diagnoses and all orders for this visit:    1. Migraine without aura and without status migrainosus, not intractable  -     rimegepant sulfate ODT (Nurtec) 75 MG disintegrating tablet; Place 1 tablet under the tongue As Needed (migraine).  Dispense: 9 tablet; Refill: 11       Follow Up:   It has been determined that Esme Santos has " chronic migraine headaches. We will proceed with a 12 week regimen of 200 units of Botox injections, to treat the patient's chronic migraines.      Return in about 3 months (around 7/23/2025) for Botox.      AROLDO Bustos, FNP-C  [facility] Neurology and Sleep Medicine

## 2025-06-02 ENCOUNTER — TELEPHONE (OUTPATIENT)
Dept: NEUROLOGY | Facility: CLINIC | Age: 60
End: 2025-06-02

## 2025-06-02 NOTE — TELEPHONE ENCOUNTER
Caller: Esme Santos    Relationship:  Self    Best call back number: [phone]    PATIENT CALLED REQUESTING TO CANCEL SAME DAY APPT.    Did the patient call AFTER the start time of their scheduled appointment?  []YES  [x]NO    Was the patient's appointment rescheduled? [x]YES  []NO    Any additional information: JUST HAD SURGERY TO REMOVE CANCER ON HER LIVER.

## 2025-06-03 NOTE — PROGRESS NOTES
Esme Santos  [id number]  1965 6/5/2025      Referring Provider:   Dwayne Santiago MD    Reason for Consultation:   Intrahepatic cholangiocarcinoma    Chief Complaint:  Intrahepatic cholangiocarcinoma      History of Present Illness   Esme Santos is a very pleasant 60 y.o.  female who presents in new consultation at the request of Dr. Dwayne Santiago for further management and evaluation of intrahepatic cholangiocarcinoma.    Ms. Santos has a past medical history significant for diabetes mellitus complicated by gastroparesis (was to have stimulator placed) and peripheral neuropathy, lupus anticoagulant (previously on Coumadin, prior stroke more than 20 years ago, prior right upper extremity thrombosis 7 to 8 years ago), hiatal hernia status post Nissen.    1/2/25: An abdominal ultrasound was performed due to abdominal pain.  This was significant for a 2.5 x 2 cm lesion in the right lobe of the liver which was lobulated and hypoechoic.   1/9/25: CT abdomen performed shows a 22 mm enhancing lesion within the posterior segment of the right lobe of the liver that was nonspecific. Lobulated liver concerning for cirrhosis.  Biopsy was recommended and she was referred to hepatology at [facility] and was evaluated by Dr. Ortega.   3/3/25: She had a CT-guided liver biopsy of the 2.1 x 1.9 cm lesion in hepatic segment 5 and was positive for malignancy adenocarcinoma.  Immunoprofile was not specific and differential diagnosis for primary tumor sites would include pancreatic or biliary, upper GI, or TTF-1 negative lung primary.  3/13/25: CT chest no axillary, hilar, or mediastinal adenopathy.  3/19/25: Workup was performed to assess for primary site. EGD performed revealed mild chronic gastritis. Colonoscopy showed a one 3 mm polyp and one 5 mm polyp in the transverse colon significant for tubular adenoma.  Recommended repeating colonoscopy in 7 years. Mammogram obtained in January 2025  shows BIRADS 2.  4/2/25: PET/CT:  significant for liver lesion (not hypermetabolic as per notes) and without enlarged lymph nodes or other sites of disease.  4/23/25: She was evaluated by Dr. Santiago with surgical oncology.  It was felt that she may have a primary hepatic adenocarcinoma that is a cholangiocarcinoma however there is no distal biliary dilatation to this lesion which was felt not to be typical for cholangiocarcinoma.  Her CBD was markedly dilated but there is no obvious lesions in the pancreas and was felt to possibly be a benign finding seen long after cholecystectomy. She had a cholecystectomy at the age of 18 for duct stone requiring a drain for a week.  Overall it was felt that she may benefit from a partial liver resection to remove the lesion.  5/15/25: Dr. Santiago performed a exploratory laparotomy, lysis of adhesions from prior RUQ resection, segment 5/6 liver resection, creation of vascularized omental flap for coverage of liver resection defect.  There is no evidence of additional disease seen outside of the liver.  Liver was firm with MAC Vinh nodular cirrhosis.  The liver resection was noted to be technically challenging because of the firm liver with high portal venous pressure.  Pathology from partial hepatectomy reveals a well-differentiated adenocarcinoma (grade 1), 3 cm, small duct intrahepatic cholangiocarcinoma. Tumor focally extends beyond capsule to involve visceral peritoneal surface. Perineural invasion identified, no evidence of lymphovascular invasion, surgical margins of resection negative, background cirrhosis with ongoing steatohepatitis. pT3, pN not assigned as no nodes submitted or found. Tempus testing: MSI stable, TMB low, IDH1 positive, PDL1 negative. HER2 negative. Claudin 18: negative  Her hospital course was complicated by pneumonia and was discharged on 5/24/25.  It was recommended that patient be on Lovenox for 28 days from surgery and will end on 6/12/2025.    She continues to have abdominal pain and  intermittently takes narcotics. She has an appointment scheduled tomorrow to remove the rest of her staples.          The following portions of the patient's history were reviewed and updated as appropriate: allergies, current medications, past family history, past medical history, past social history, past surgical history and problem list.      Allergies   Allergen Reactions    Contrast Dye (Echo Or Unknown Ct/Mr) Anaphylaxis    Iodinated Contrast Media Anaphylaxis    Lyrica [Pregabalin] Anaphylaxis    Influenza Virus Vaccine Unknown - Low Severity     SITE SWELLING AND REDNESS    Other Other (See Comments)    Zonisamide Unknown - High Severity         Past Medical History:   Diagnosis Date    Arthritis     Asthma     Celiac disease     Diabetes     Elevated cholesterol     Gastroparesis     GERD (gastroesophageal reflux disease)     History of transfusion     Hypertension     Kidney stones     Leaky heart valve     Migraines on Botox injections every three months      PONV (postoperative nausea and vomiting)     Stomach ulcer     Stroke (cerebrum)    DVT  Lupus anticoagulant         Past Surgical History:   Procedure Laterality Date    ABDOMINAL SURGERY      APPENDECTOMY      BLADDER SUSPENSION      sling and sling removal and mesh replaced(X3)    BRAVO PROCEDURE N/A 08/08/2018    Procedure: ESOPHAGOGASTRODUODENOSCOPY AND CORTEZ;  Surgeon: Mele Rahman MD;  Location: [facility] OR;  Service: Gastroenterology    CHOLECYSTECTOMY      COLONOSCOPY N/A 08/08/2018    Procedure: COLONOSCOPY;  Surgeon: Mele Rahman MD;  Location: [facility] OR;  Service: Gastroenterology    ENDOSCOPY      FRACTURE SURGERY Left     quiana  in femur     HIATAL HERNIA REPAIR N/A 09/26/2018    Procedure: HIATAL HERNIA REPAIR LAPAROSCOPIC;  Surgeon: Meel Rahman MD;  Location: [facility] OR;  Service: General    HYSTERECTOMY - LEONARD-BSO  11 YEARS AGO    KIDNEY STONE SURGERY  01/2021    LEG SURGERY      NISSEN FUNDOPLICATION N/A 09/26/2018     Procedure: NISSEN FUNDOPLICATION LAPAROSCOPIC;  Surgeon: Mele Rahman MD;  Location: [facility];  Service: General    REPLACEMENT TOTAL KNEE Left 03/29/2023             Social History     Socioeconomic History    Marital status:    Tobacco Use    Smoking status: Never     Passive exposure: Never    Smokeless tobacco: Never   Vaping Use    Vaping status: Never Used   Substance and Sexual Activity    Alcohol use: Yes     Comment: occ    Drug use: No    Sexual activity: Defer   She is  and has two children. She denies of any tobacco use or significant alcohol consumption (4 to 5 drinks yearly).        Family History   Problem Relation Age of Onset    Hypertension Mother     Heart disease Mother     Cervical cancer Mother     Diabetes Mother     Stroke Mother     Hypertension Father     Heart disease Father     Cancer unknown Paternal Aunt     Lung cancer Maternal Grandmother     Diabetes Maternal Grandmother     Hypertension Maternal Grandmother    Brother - Multiple Myeloma         Current Outpatient Medications:     B-D UF III MINI PEN NEEDLES 31G X 5 MM misc, , Disp: , Rfl:     Continuous Blood Gluc Sensor (Dexcom G6 Sensor), AS DIRECTED CHANGE SENSOR EVERY 10 DAYS, Disp: , Rfl:     Continuous Blood Gluc Transmit (Dexcom G6 Transmitter) misc, , Disp: , Rfl:     dilTIAZem (TIAZAC) 180 MG 24 hr capsule, Take 1 capsule by mouth Daily., Disp: , Rfl:     enoxaparin sodium (LOVENOX) 40 MG/0.4ML solution prefilled syringe syringe, Inject 0.4 mL under the skin into the appropriate area as directed Daily., Disp: , Rfl:     Farxiga 10 MG tablet, , Disp: , Rfl:     gabapentin (NEURONTIN) 600 MG tablet, Take 1 tablet by mouth Daily., Disp: , Rfl:     HYDROcodone-acetaminophen (NORCO) 5-325 MG per tablet, Take 1 tablet by mouth every night at bedtime., Disp: , Rfl:     Linzess 72 MCG capsule capsule, , Disp: , Rfl:     lisinopril (PRINIVIL,ZESTRIL) 40 MG tablet, , Disp: , Rfl:     OnabotulinumtoxinA (Botox) 200  units reconstituted solution, Inject  into the appropriate area of the skin as directed by provider., Disp: , Rfl:     oxyCODONE (ROXICODONE) 5 MG immediate release tablet, Take 1 tablet by mouth every night at bedtime., Disp: , Rfl:     pantoprazole (PROTONIX) 40 MG EC tablet, , Disp: , Rfl:     promethazine (PHENERGAN) 25 MG tablet, , Disp: , Rfl:     pyridostigmine (MESTINON) 60 MG tablet, , Disp: , Rfl:     Rimegepant Sulfate (Nurtec) 75 MG tablet dispersible tablet, Take 1 tablet by mouth Daily., Disp: 2 tablet, Rfl: 0    rOPINIRole (REQUIP) 2 MG tablet, Take 1 tablet by mouth., Disp: , Rfl:     rosuvastatin (CRESTOR) 20 MG tablet, , Disp: , Rfl:     aspirin 81 MG EC tablet, Take 1 tablet by mouth Daily., Disp: , Rfl:     folic acid (FOLVITE) 1 MG tablet, Take 1 tablet by mouth Daily., Disp: , Rfl:     omega-3 acid ethyl esters (LOVAZA) 1 g capsule, , Disp: , Rfl:     rimegepant sulfate ODT (Nurtec) 75 MG disintegrating tablet, Place 1 tablet under the tongue As Needed (migraine)., Disp: 9 tablet, Rfl: 11    Current Facility-Administered Medications:     OnabotulinumtoxinA 200 Units, 200 Units, Intramuscular, Q3 Months, Sonia Camacho, AROLDO, 165 Units at 04/23/25 1428          Review of Systems  Constitutional: No fever, chills, night sweats or weight loss.   HEENT:  No headaches, vision changes or hearing changes, no sinus drainage, sore throat.   Cardiovascular:  No palpitations, chest pain, syncopal episodes or edema.   Pulmonary:  No shortness of breath, hemoptysis, or cough.   Gastrointestinal:  Positive nausea, no vomiting.  No constipation or diarrhea. Positive abdominal pain. No melena or hematochezia.   Genitourinary:  No hematuria, or changes in urination.   Musculoskeletal:  No pain, or joint problems.   Skin: No rashes or pruritus.   Endocrine:  Positive hot flashes and chills   Hematologic:  No history of free bleeding, spontaneous bleeding or clotting problems. No lymphadenopathy.     Immunologic:  Seasonal allergies, no frequent infections.   Neurologic: No numbness, tingling, or weakness.   Psychiatric:  No anxiety or depression.           Physical Exam  Vital Signs: These were reviewed and listed as per patient’s electronic medical chart  Vitals:    06/05/25 1439   BP: (!) 154/102   Pulse: 86   Resp: 18   Temp: 98.1 °F (36.7 °C)   SpO2: 98%     General: Awake, alert and oriented, in no distress  HEENT: Head is atraumatic, normocephalic, extraocular movements full, no scleral icterus  Neck: supple, no jvd, lymphadenopathy or masses  Cardiovascular: regular rate and rhythm without murmurs, rubs or gallops  Pulmonary: non-labored, clear to auscultation bilaterally, no wheezing, decreased breath sounds at right base and left upper lobe  Abdomen: soft, non-tender, non-distended, normal active bowel sounds present, no organomegaly, midline abdominal staples in place, appears to be well healed  Extremities: No clubbing, cyanosis or edema  Lymph: No cervical, supraclavicular adenopathy  Neurologic: Mental status as above, alert, awake and oriented, grossly non-focal exam  Skin: warm, dry, intact        Pain Score:  Pain Score    06/05/25 1439   PainSc: 6    PainLoc: Abdomen           PHQ-Score Total:  PHQ-9 Total Score:          IMAGING:  US abdomen complete (01/02/2025 11:37)   FINDINGS: There is a 2.5 x 2 cm lesion in the right lobe of the liver. This is lobulated and hypoechoic. This may represent a complex cyst. The gall bladder is absent.  The common duct is normal. The right kidney measures 11.5 cm in length and is normal in echogenicity without hydronephrosis. The left kidney measures 11.7 cm in length and is normal in echogenicity without hydronephrosis.  Spleen is unremarkable.  The aorta is normal in caliber. The vena cava is unremarkable.   IMPRESSION: Hypoechoic focus in the right lobe of the liver, may represent a complex cyst. Recommend infused CT to better characterize this.  Otherwise, unremarkable abdominal ultrasound.     CT Abdomen Pelvis With Contrast (01/09/2025 15:25)   FINDINGS: ABDOMEN: The lung bases are clear. The heart is proper size. The liver is lobulated. There is a 22 mm enhancing lesion within the posterior segment of the right lobe of the liver, nonspecific. The gallbladder is absent. The spleen is unremarkable. No adrenal mass is present.  The pancreas is unremarkable. The kidneys are unremarkable. The aorta is   proper caliber. Pre-contrast images demonstrate no evidence of nephrolithiasis. PELVIS: The appendix is not identified. The urinary bladder is unremarkable. There is no significant free fluid or adenopathy.   IMPRESSION: Nonspecific liver lesion, neoplasm not excluded. Recommend biopsy for further evaluation. Cirrhosis.     Mammo Screening Digital Tomosynthesis Bilateral With CAD (01/17/2025 11:49)   FINDINGS:  The breast parenchyma is heterogenously dense, which may obscure small masses. Benign-appearing calcifications. There are no suspicious masses, areas of architectural distortion or clustered microcalcifications.   BI-RADS CATEGORY: 2 , BENIGN FINDING(S).   RECOMMENDED FOLLOW-UP: 12M   FOLLOW UP SCREENING MAMMOGRAM IN ONE YEAR.     CT Chest With Contrast Diagnostic (03/13/2025 14:29)   FINDINGS: CHEST: There is no axillary adenopathy. There is no hilar or mediastinal adenopathy. Heart size is normal. There is no pericardial or pleural effusion. Limited images of the upper abdomen show prior cholecystectomy. Known hepatic lesion is poorly demonstrated on this exam. No suspicious infiltrate or nodule identified.   IMPRESSION: No acute process.     CT Abdomen Pelvis With Contrast (05/17/2025 15:40)   CT Chest With Contrast Diagnostic (05/17/2025 15:40)   FINDINGS: Chest: Lymph Nodes and Mediastinum: No lymphadenopathy by CT size criteria. Unchanged right cardiophrenic lymph nodes. No suspicious thyroid findings. Cardiovascular: The heart is normal in  caliber. Thoracic great vessels are patent. Lungs and Pleura: Central airways are clear. Complete collapse of the right lower lobe. Partial atelectasis in the left lower lobe. Moderate right pleural effusion and small left pleural effusions. Musculoskeletal and Body Wall: No clearly aggressive bone lesions. Abdomen/Pelvis: Solid Abdominal Organs: Surgical changes of hepatic segment 5/6 resection with surgical edema fluid and locules of gas along the resection margin. Prior cholecystectomy. Extrahepatic biliary ductal ectasia. Unremarkable spleen, pancreas, bilateral adrenal glands and kidneys. No hydronephrosis. GI Tract/Mesentery/Peritoneum: Nondistended stomach. Nasogastric tube tip in the mid stomach. Wall thickening of the distal stomach and duodenum, likely reactive. No bowel obstruction. Pelvic Viscera: Partially distended urinary bladder with catheter in place. Prior hysterectomy. Lymph Nodes/Vasculature: Few prominent lymph nodes near the harriet hepatis, unchanged. Portacaval 13 mm short axis lymph node. The aortoiliac vasculature is patent and normal in caliber. Splenoportal system appears patent. Free Fluid: Small volume perihepatic fluid containing locules of gas. Small volume free fluid in the pelvis. Musculoskeletal and Body Wall: Surgical changes of the midline abdomen.   IMPRESSION: Chest: Moderate right and small left pleural effusions with associated atelectasis and complete collapse of the right lower lobe. Abdomen/Pelvis: Surgical changes of hepatic segment 5/6 resection with perihepatic fluid and locules of gas along the resection margin and posterior to the liver. This may be postsurgical, related to superimposed infection or necrosis. Small volume fluid in the pelvis. No abdominal or pelvic abscess. Mild wall thickening of the distal stomach and descending duodenum, likely reactive.     CT Abdomen Pelvis Without Contrast (05/22/2025 10:50)   FINDINGS: Lower Chest: Large right and small left  pleural effusions which appear similar to prior. Atelectasis of the right lower lobe. Analysis of the abdominopelvic viscera is limited by the absence of intravenous contrast material. Solid Abdominal Organs: Changes of segment V/VI liver resection with some adjacent hypodensity in the liver representing edema and fluid. Interval resolution of gas locules along the collection margin. Prior cholecystectomy. No biliary ductal dilatation. Normal noncontrast appearance of the spleen, pancreas, and adrenal glands. No hydronephrosis. GI Tract/Mesentery/Peritoneum: The small large bowel are normal by caliber. No peritoneal free air. Diffuse mesenteric edema limits evaluation for focal inflammatory change. No mesenteric fluid collection. There is right upper quadrant stranding. Pelvic Viscera: No pelvic mass.Lymph Nodes/Vasculature: There are again multiple enlarged and borderline enlarged nodes in the periportal region and also a 12 mm short axis enlarged gastrohepatic lymph node (series 3 image 92). Free Fluid: Small volume free fluid, perihepatic along the paracolic gutters and pelvis. Musculoskeletal and Body Wall: No suspicious osseous lesions. Midline ventral surgical scar with small associated periincisional collection measuring up to 27 mm on series 3 image 163.   IMPRESSION: Changes of hepatic wedge resection with expected right upper quadrant and periduodenal stranding. No intra-abdominal or intrapelvic fluid collection. Small volume free fluid in the abdomen and pelvis.         LABS/STUDIES:  No visits with results within 6 Month(s) from this visit.   Latest known visit with results is:   Lab on 02/26/2020   Component Date Value    WBC 02/26/2020 18.43 (H)     RBC 02/26/2020 4.74     Hemoglobin 02/26/2020 13.1     Hematocrit 02/26/2020 38.5     MCV 02/26/2020 81.2     MCH 02/26/2020 27.6     MCHC 02/26/2020 34.0     RDW 02/26/2020 12.6     RDW-SD 02/26/2020 36.5 (L)     MPV 02/26/2020 11.1     Platelets  02/26/2020 300     Neutrophil % 02/26/2020 61.8     Lymphocyte % 02/26/2020 31.0     Monocyte % 02/26/2020 5.3     Eosinophil % 02/26/2020 0.7     Basophil % 02/26/2020 0.5     Immature Grans % 02/26/2020 0.7 (H)     Neutrophils, Absolute 02/26/2020 11.39 (H)     Lymphocytes, Absolute 02/26/2020 5.72 (H)     Monocytes, Absolute 02/26/2020 0.97 (H)     Eosinophils, Absolute 02/26/2020 0.13     Basophils, Absolute 02/26/2020 0.10     Immature Grans, Absolute 02/26/2020 0.12 (H)     nRBC 02/26/2020 0.0          PATHOLOGY:   3/3/25 CT GUIDED LIVER BIOPSY AT              3/19/25 COLONOSCOPY AT         5/15/25 PARTIAL HEPATECTOMY AT                 Assessment & Plan   Esme Santos is a very pleasant 60 y.o.  female who presents in new consultation at the request of Dr. Dwayne Santiago for further management and evaluation of intrahepatic cholangiocarcinoma.    Intrahepatic cholangiocarcinoma (pT3, NX, M0)  - Diagnosed via CT guided biopsy with adenocarcinoma in March 2025 and is s/p segment 5/6 liver resection with Dr. Santiago on 5/15/25. Pathology was significant for a small duct intrahepatic pT3 cholangiocarcinoma, 3 cm, with negative margins. AFP prior to surgery was normal at <2.3. Tempus testing: MSI stable, TMB low, IDH1 positive, PDL1 negative. HER2 negative. Claudin 18: negative.  - She was evaluated by Dr. Santiago post surgical resection and recommended adjuvant chemotherapy and opted for adjuvant treatment locally.   - I spent some time reviewing her pathology and imaging results as well as pathology, staging, and prognosis as well as recommended adjuvant treatment.   - I would recommend adjuvant capecitabine 1,250 mg/m2 po twice daily on days 1-14 every 21 days for six months as adjuvant chemotherapy has been associated with overall survival benefit based on the BILCAP study. Patient should be routinely evaluated for toxicities to assess whether she will require dose adjustment. If patient develops  progression or metastatic disease would recommend comprehensive molecular testing.    - I have discussed the above recommendations at length with Ms. Santos and she would like to proceed with adjuvant treatment after we discussed the possible risks, benefits, and toxicities of treatment.  - Will reevaluate her in clinic in 2 weeks to reassess her wound once staples have been removed and reassess her overall performance status prior to starting adjuvant treatment.    Surveillance  - Would recommend CT vs MRI abdomen and pelvis as well as CT chest with IV contrast every 3 to 6 months for two years, then every 6 to 12 months for up to 5 years. She is allergic to IV contrast and will require premedications for imaging.     Lupus anticoagulant  - Ms. Santos reports being told in the past that she has lupus anticoagulant which makes her at risk for clots.  - She has no family history significant for recurrent thrombosis or recurrent miscarriages.  She was diagnosed with a right upper extremity clot 2 days after being discharged from the hospital in her 40s and was on anticoagulation for a short period of time.  Several years after that she developed a blood clot in her lungs and at that time was started on Coumadin and remained on the medication for several years before stopping the medication with her hematologist due to excess bleeding.  Since that time patient has had knee surgery as well as fracture repair and was reportedly at risk received a postop Lovenox for 1 week given her history of lupus anticoagulant.  - She was evaluated by hematology at [facility] for possible antiphospholipid syndrome concern.  It was felt that there was no convincing evidence that patient currently has a hypercoagulable state and recommended Lovenox at prophylactic dose while inpatient and has continued for 1 month postsurgery.    URI  Shortness of breath  - Will start Augmentin BID for ten days and empirically prescribe fluconazole as she  develops yeast infections.  - She was previously following with pulmonary and is now on supplemental oxygen will place referral today. She has a prior history significant for asthma.     Folate deficiency history  - She is currently not on folic acid we will check this today along with B12.    ACO / DEANA/Other  Quality measures  -  Esme Santos  reports that she has never smoked. She has never been exposed to tobacco smoke. She has never used smokeless tobacco.   -  Esme Santos unknown if she received 2024 flu vaccine.  -  Esme Santos reports a pain score of 6.  Given her pain assessment as noted, treatment options were discussed and the following options were decided upon as a follow-up plan to address the patient's pain: continuation of current treatment plan for pain.  -  Current outpatient and discharge medications have been reconciled for the patient.  Reviewed by: Monse Lyn MD      I will have the patient return in follow up appointment with NP to reassess her abdominal wound once staples have been removed with labs (CBC, CMP) and will need follow up around 1.5 to three weeks after starting treatment to assess tolerability. She understands that should she have any questions or concerns prior to her appointment she should give us a call at any time and I would be happy to see her sooner. It was a pleasure to see this patient in clinic today, thank you for allowing me to participate in the care of this patient.    I spent 80 minutes caring for patient on this date of service. This time includes time spent by me in the following activities: preparing for the visit, reviewing tests, obtaining and/or reviewing a separately obtained history, performing a medically appropriate examination and/or evaluation, counseling and educating the patient/family/caregiver, ordering medications, tests, or procedures, documenting information in the medical record, independently interpreting results and communicating  that information with the patient/family/caregiver, and care coordination as well as answering any questions.        Monse Lyn MD   06/05/25   14:59 EDT

## 2025-06-05 ENCOUNTER — OFFICE VISIT (OUTPATIENT)
Age: 60
End: 2025-06-05
Payer: MEDICAID

## 2025-06-05 VITALS
SYSTOLIC BLOOD PRESSURE: 154 MMHG | BODY MASS INDEX: 27.75 KG/M2 | TEMPERATURE: 98.1 F | OXYGEN SATURATION: 98 % | RESPIRATION RATE: 18 BRPM | WEIGHT: 176.8 LBS | HEART RATE: 86 BPM | HEIGHT: 67 IN | DIASTOLIC BLOOD PRESSURE: 102 MMHG

## 2025-06-05 DIAGNOSIS — J45.909 MODERATE ASTHMA, UNSPECIFIED WHETHER COMPLICATED, UNSPECIFIED WHETHER PERSISTENT: ICD-10-CM

## 2025-06-05 DIAGNOSIS — J06.9 UPPER RESPIRATORY TRACT INFECTION, UNSPECIFIED TYPE: ICD-10-CM

## 2025-06-05 DIAGNOSIS — R76.0 LUPUS ANTICOAGULANT POSITIVE: ICD-10-CM

## 2025-06-05 DIAGNOSIS — C22.1 INTRAHEPATIC CHOLANGIOCARCINOMA: Primary | ICD-10-CM

## 2025-06-05 DIAGNOSIS — E53.8 FOLATE DEFICIENCY: ICD-10-CM

## 2025-06-05 DIAGNOSIS — J90 PLEURAL EFFUSION: ICD-10-CM

## 2025-06-05 LAB
ALBUMIN SERPL-MCNC: 4 G/DL (ref 3.5–5.2)
ALBUMIN/GLOB SERPL: 0.9 G/DL
ALP SERPL-CCNC: 182 U/L (ref 39–117)
ALT SERPL W P-5'-P-CCNC: 24 U/L (ref 1–33)
ANION GAP SERPL CALCULATED.3IONS-SCNC: 13.1 MMOL/L (ref 5–15)
AST SERPL-CCNC: 119 U/L (ref 1–32)
BASOPHILS # BLD AUTO: 0.09 10*3/MM3 (ref 0–0.2)
BASOPHILS NFR BLD AUTO: 0.8 % (ref 0–1.5)
BILIRUB SERPL-MCNC: 0.4 MG/DL (ref 0–1.2)
BUN SERPL-MCNC: 8.5 MG/DL (ref 8–23)
BUN/CREAT SERPL: 15.5 (ref 7–25)
CALCIUM SPEC-SCNC: 9.3 MG/DL (ref 8.6–10.5)
CHLORIDE SERPL-SCNC: 102 MMOL/L (ref 98–107)
CO2 SERPL-SCNC: 21.9 MMOL/L (ref 22–29)
CREAT SERPL-MCNC: 0.55 MG/DL (ref 0.57–1)
DEPRECATED RDW RBC AUTO: 43.1 FL (ref 37–54)
EGFRCR SERPLBLD CKD-EPI 2021: 104.4 ML/MIN/1.73
EOSINOPHIL # BLD AUTO: 0.26 10*3/MM3 (ref 0–0.4)
EOSINOPHIL NFR BLD AUTO: 2.3 % (ref 0.3–6.2)
ERYTHROCYTE [DISTWIDTH] IN BLOOD BY AUTOMATED COUNT: 13.2 % (ref 12.3–15.4)
GLOBULIN UR ELPH-MCNC: 4.4 GM/DL
GLUCOSE SERPL-MCNC: 189 MG/DL (ref 65–99)
HCT VFR BLD AUTO: 41.4 % (ref 34–46.6)
HGB BLD-MCNC: 12.9 G/DL (ref 12–15.9)
IMM GRANULOCYTES # BLD AUTO: 0.07 10*3/MM3 (ref 0–0.05)
IMM GRANULOCYTES NFR BLD AUTO: 0.6 % (ref 0–0.5)
LYMPHOCYTES # BLD AUTO: 3.57 10*3/MM3 (ref 0.7–3.1)
LYMPHOCYTES NFR BLD AUTO: 30.9 % (ref 19.6–45.3)
MCH RBC QN AUTO: 28 PG (ref 26.6–33)
MCHC RBC AUTO-ENTMCNC: 31.2 G/DL (ref 31.5–35.7)
MCV RBC AUTO: 90 FL (ref 79–97)
MONOCYTES # BLD AUTO: 0.73 10*3/MM3 (ref 0.1–0.9)
MONOCYTES NFR BLD AUTO: 6.3 % (ref 5–12)
NEUTROPHILS NFR BLD AUTO: 59.1 % (ref 42.7–76)
NEUTROPHILS NFR BLD AUTO: 6.83 10*3/MM3 (ref 1.7–7)
NRBC BLD AUTO-RTO: 0 /100 WBC (ref 0–0.2)
PLATELET # BLD AUTO: 251 10*3/MM3 (ref 140–450)
PMV BLD AUTO: 11 FL (ref 6–12)
POTASSIUM SERPL-SCNC: 3.9 MMOL/L (ref 3.5–5.2)
PROT SERPL-MCNC: 8.4 G/DL (ref 6–8.5)
RBC # BLD AUTO: 4.6 10*6/MM3 (ref 3.77–5.28)
SODIUM SERPL-SCNC: 137 MMOL/L (ref 136–145)
WBC NRBC COR # BLD AUTO: 11.55 10*3/MM3 (ref 3.4–10.8)

## 2025-06-05 PROCEDURE — 85732 THROMBOPLASTIN TIME PARTIAL: CPT | Performed by: INTERNAL MEDICINE

## 2025-06-05 PROCEDURE — 82105 ALPHA-FETOPROTEIN SERUM: CPT | Performed by: INTERNAL MEDICINE

## 2025-06-05 PROCEDURE — 85705 THROMBOPLASTIN INHIBITION: CPT | Performed by: INTERNAL MEDICINE

## 2025-06-05 PROCEDURE — 85670 THROMBIN TIME PLASMA: CPT | Performed by: INTERNAL MEDICINE

## 2025-06-05 PROCEDURE — 86147 CARDIOLIPIN ANTIBODY EA IG: CPT | Performed by: INTERNAL MEDICINE

## 2025-06-05 PROCEDURE — 82747 ASSAY OF FOLIC ACID RBC: CPT | Performed by: INTERNAL MEDICINE

## 2025-06-05 PROCEDURE — 85613 RUSSELL VIPER VENOM DILUTED: CPT | Performed by: INTERNAL MEDICINE

## 2025-06-05 PROCEDURE — 82607 VITAMIN B-12: CPT | Performed by: INTERNAL MEDICINE

## 2025-06-05 PROCEDURE — 82378 CARCINOEMBRYONIC ANTIGEN: CPT | Performed by: INTERNAL MEDICINE

## 2025-06-05 PROCEDURE — 86146 BETA-2 GLYCOPROTEIN ANTIBODY: CPT | Performed by: INTERNAL MEDICINE

## 2025-06-05 PROCEDURE — 85014 HEMATOCRIT: CPT | Performed by: INTERNAL MEDICINE

## 2025-06-05 PROCEDURE — 85025 COMPLETE CBC W/AUTO DIFF WBC: CPT | Performed by: INTERNAL MEDICINE

## 2025-06-05 PROCEDURE — 80053 COMPREHEN METABOLIC PANEL: CPT | Performed by: INTERNAL MEDICINE

## 2025-06-05 RX ORDER — ENOXAPARIN SODIUM 100 MG/ML
40 INJECTION SUBCUTANEOUS DAILY
COMMUNITY
Start: 2025-05-24 | End: 2025-06-12

## 2025-06-05 RX ORDER — FLUCONAZOLE 100 MG/1
100 TABLET ORAL DAILY
Qty: 10 TABLET | Refills: 0 | Status: SHIPPED | OUTPATIENT
Start: 2025-06-05 | End: 2025-06-15

## 2025-06-05 NOTE — PROGRESS NOTES
Venipuncture Blood Specimen Collection  Venipuncture performed in Right hand by Daisha Jalloh MA with good hemostasis. Patient tolerated the procedure well without complications.   06/05/25   Daisha Jalloh MA

## 2025-06-06 ENCOUNTER — TELEPHONE (OUTPATIENT)
Dept: ONCOLOGY | Facility: CLINIC | Age: 60
End: 2025-06-06
Payer: MEDICAID

## 2025-06-06 LAB
ALPHA-FETOPROTEIN: 2.33 NG/ML (ref 0–8.3)
CEA SERPL-MCNC: 0.94 NG/ML
VIT B12 BLD-MCNC: 639 PG/ML (ref 211–946)

## 2025-06-06 NOTE — TELEPHONE ENCOUNTER
RN called [facility] who confirmed they have filled the prescriptions sent from yesterday and they are ready for pickup. RN then called patient and informed her of this. She voiced understanding. While on the phone, she inquired if Dr. Lyn would be the provider to keep up with her scans so she could let her PCP know. RN confirmed as MD note states she would recommend CT vs MRI abdomen and pelvis as well as CT chest with IV contrast every 3 to 6 months for two years, then every 6 to 12 months for up to 5 years. Patient voiced understanding. RN encouraged her to please call us with any further questions or concerns.

## 2025-06-06 NOTE — TELEPHONE ENCOUNTER
Patient advised MD was supposed to call her in two antibiotics yesterday but neither has been called in. She saw her PCP this morning who called her in something for the thrush she is getting, PCP also advised her to contact our office to see if we can do chest scans for her.

## 2025-06-07 LAB
CARDIOLIPIN IGG SER IA-ACNC: 10 GPL U/ML (ref 0–14)
CARDIOLIPIN IGM SER IA-ACNC: <9 MPL U/ML (ref 0–12)
FOLATE BLD-MCNC: 448 NG/ML
FOLATE RBC-MCNC: 1062 NG/ML
HCT VFR BLD AUTO: 42.2 % (ref 34–46.6)

## 2025-06-08 LAB
APTT SCREEN TO CONFIRM RATIO: 1.04 RATIO (ref 0–1.34)
CONFIRM APTT/NORMAL: 33.6 SEC (ref 0–47.6)
LA 2 SCREEN W REFLEX-IMP: NORMAL
SCREEN APTT: 37.8 SEC (ref 0–43.5)
SCREEN DRVVT: 37 SEC (ref 0–47)
THROMBIN TIME: 20.6 SEC (ref 0–23)

## 2025-06-09 ENCOUNTER — PATIENT ROUNDING (BHMG ONLY) (OUTPATIENT)
Dept: ONCOLOGY | Facility: CLINIC | Age: 60
End: 2025-06-09
Payer: MEDICAID

## 2025-06-09 PROBLEM — C22.1 INTRAHEPATIC CHOLANGIOCARCINOMA: Status: ACTIVE | Noted: 2025-06-09

## 2025-06-09 LAB
B2 GLYCOPROT1 IGA SER-ACNC: <9 GPI IGA UNITS (ref 0–25)
B2 GLYCOPROT1 IGG SER-ACNC: <9 GPI IGG UNITS (ref 0–20)
B2 GLYCOPROT1 IGM SER-ACNC: <9 GPI IGM UNITS (ref 0–32)

## 2025-06-18 ENCOUNTER — SPECIALTY PHARMACY (OUTPATIENT)
Dept: ONCOLOGY | Facility: HOSPITAL | Age: 60
End: 2025-06-18
Payer: MEDICAID

## 2025-06-18 ENCOUNTER — OFFICE VISIT (OUTPATIENT)
Dept: ONCOLOGY | Facility: CLINIC | Age: 60
End: 2025-06-18
Payer: MEDICAID

## 2025-06-18 VITALS
BODY MASS INDEX: 29.03 KG/M2 | WEIGHT: 185 LBS | HEART RATE: 81 BPM | TEMPERATURE: 97.3 F | OXYGEN SATURATION: 95 % | RESPIRATION RATE: 18 BRPM | HEIGHT: 67 IN | DIASTOLIC BLOOD PRESSURE: 79 MMHG | SYSTOLIC BLOOD PRESSURE: 132 MMHG

## 2025-06-18 DIAGNOSIS — E53.8 FOLATE DEFICIENCY: Primary | ICD-10-CM

## 2025-06-18 DIAGNOSIS — C22.1 INTRAHEPATIC CHOLANGIOCARCINOMA: ICD-10-CM

## 2025-06-18 DIAGNOSIS — R76.0 LUPUS ANTICOAGULANT POSITIVE: ICD-10-CM

## 2025-06-18 LAB
ALBUMIN SERPL-MCNC: 3.7 G/DL (ref 3.5–5.2)
ALBUMIN/GLOB SERPL: 1.2 G/DL
ALP SERPL-CCNC: 145 U/L (ref 39–117)
ALT SERPL W P-5'-P-CCNC: 22 U/L (ref 1–33)
ANION GAP SERPL CALCULATED.3IONS-SCNC: 9.9 MMOL/L (ref 5–15)
AST SERPL-CCNC: 70 U/L (ref 1–32)
BASOPHILS # BLD AUTO: 0.06 10*3/MM3 (ref 0–0.2)
BASOPHILS NFR BLD AUTO: 0.7 % (ref 0–1.5)
BILIRUB SERPL-MCNC: 0.3 MG/DL (ref 0–1.2)
BUN SERPL-MCNC: 10.4 MG/DL (ref 8–23)
BUN/CREAT SERPL: 18.6 (ref 7–25)
CALCIUM SPEC-SCNC: 8.8 MG/DL (ref 8.6–10.5)
CHLORIDE SERPL-SCNC: 106 MMOL/L (ref 98–107)
CO2 SERPL-SCNC: 24.1 MMOL/L (ref 22–29)
CREAT SERPL-MCNC: 0.56 MG/DL (ref 0.57–1)
DEPRECATED RDW RBC AUTO: 45.5 FL (ref 37–54)
EGFRCR SERPLBLD CKD-EPI 2021: 104 ML/MIN/1.73
EOSINOPHIL # BLD AUTO: 0.3 10*3/MM3 (ref 0–0.4)
EOSINOPHIL NFR BLD AUTO: 3.7 % (ref 0.3–6.2)
ERYTHROCYTE [DISTWIDTH] IN BLOOD BY AUTOMATED COUNT: 13.4 % (ref 12.3–15.4)
GLOBULIN UR ELPH-MCNC: 3.2 GM/DL
GLUCOSE SERPL-MCNC: 190 MG/DL (ref 65–99)
HCT VFR BLD AUTO: 38.2 % (ref 34–46.6)
HGB BLD-MCNC: 11.7 G/DL (ref 12–15.9)
IMM GRANULOCYTES # BLD AUTO: 0.03 10*3/MM3 (ref 0–0.05)
IMM GRANULOCYTES NFR BLD AUTO: 0.4 % (ref 0–0.5)
LYMPHOCYTES # BLD AUTO: 2.82 10*3/MM3 (ref 0.7–3.1)
LYMPHOCYTES NFR BLD AUTO: 34.5 % (ref 19.6–45.3)
MCH RBC QN AUTO: 28.4 PG (ref 26.6–33)
MCHC RBC AUTO-ENTMCNC: 30.6 G/DL (ref 31.5–35.7)
MCV RBC AUTO: 92.7 FL (ref 79–97)
MONOCYTES # BLD AUTO: 0.61 10*3/MM3 (ref 0.1–0.9)
MONOCYTES NFR BLD AUTO: 7.5 % (ref 5–12)
NEUTROPHILS NFR BLD AUTO: 4.35 10*3/MM3 (ref 1.7–7)
NEUTROPHILS NFR BLD AUTO: 53.2 % (ref 42.7–76)
NRBC BLD AUTO-RTO: 0 /100 WBC (ref 0–0.2)
PLATELET # BLD AUTO: 176 10*3/MM3 (ref 140–450)
PMV BLD AUTO: 10.3 FL (ref 6–12)
POTASSIUM SERPL-SCNC: 3.6 MMOL/L (ref 3.5–5.2)
PROT SERPL-MCNC: 6.9 G/DL (ref 6–8.5)
RBC # BLD AUTO: 4.12 10*6/MM3 (ref 3.77–5.28)
SODIUM SERPL-SCNC: 140 MMOL/L (ref 136–145)
WBC NRBC COR # BLD AUTO: 8.17 10*3/MM3 (ref 3.4–10.8)

## 2025-06-18 PROCEDURE — 85025 COMPLETE CBC W/AUTO DIFF WBC: CPT

## 2025-06-18 PROCEDURE — 80053 COMPREHEN METABOLIC PANEL: CPT

## 2025-06-18 RX ORDER — INSULIN LISPRO 100 [IU]/ML
INJECTION, SOLUTION INTRAVENOUS; SUBCUTANEOUS
COMMUNITY

## 2025-06-18 RX ORDER — AMOXICILLIN 250 MG
1 CAPSULE ORAL DAILY
COMMUNITY

## 2025-06-18 RX ORDER — INSULIN GLARGINE 100 [IU]/ML
36 INJECTION, SOLUTION SUBCUTANEOUS EVERY MORNING
COMMUNITY

## 2025-06-18 RX ORDER — DIAZEPAM 10 MG/1
10 TABLET ORAL DAILY
COMMUNITY

## 2025-06-18 RX ORDER — METHOCARBAMOL 500 MG/1
500 TABLET, FILM COATED ORAL DAILY
COMMUNITY

## 2025-06-18 RX ORDER — CAPECITABINE 500 MG/1
2500 TABLET, FILM COATED ORAL 2 TIMES DAILY
Qty: 140 TABLET | Refills: 7 | Status: SHIPPED | OUTPATIENT
Start: 2025-06-18

## 2025-06-18 RX ORDER — MONTELUKAST SODIUM 10 MG/1
10 TABLET ORAL NIGHTLY
COMMUNITY

## 2025-06-18 RX ORDER — FLUTICASONE PROPIONATE 50 MCG
2 SPRAY, SUSPENSION (ML) NASAL DAILY PRN
COMMUNITY

## 2025-06-18 RX ORDER — CAPECITABINE 500 MG/1
2500 TABLET, FILM COATED ORAL 2 TIMES DAILY
Qty: 140 TABLET | Refills: 7 | Status: SHIPPED | OUTPATIENT
Start: 2025-06-18 | End: 2025-06-18 | Stop reason: SDUPTHER

## 2025-06-18 RX ORDER — ACYCLOVIR 400 MG/1
1 TABLET ORAL
COMMUNITY

## 2025-06-18 RX ORDER — TRIAMCINOLONE ACETONIDE 1 MG/G
CREAM TOPICAL
COMMUNITY

## 2025-06-18 RX ORDER — ALBUTEROL SULFATE 0.83 MG/ML
2.5 SOLUTION RESPIRATORY (INHALATION) EVERY 6 HOURS
COMMUNITY

## 2025-06-18 NOTE — PROGRESS NOTES
Esme Santos  [id number]  1965 6/18/2025      Referring Provider:   Dwayne Santiago MD    Reason for Consultation:   Intrahepatic cholangiocarcinoma    Chief Complaint:  Intrahepatic cholangiocarcinoma      History of Present Illness   Esme Santos is a very pleasant 60 y.o.  female who presents in new consultation at the request of Dr. Dwayne Santiago for further management and evaluation of intrahepatic cholangiocarcinoma.    Ms. Santos has a past medical history significant for diabetes mellitus complicated by gastroparesis (was to have stimulator placed) and peripheral neuropathy, lupus anticoagulant (previously on Coumadin, prior stroke more than 20 years ago, prior right upper extremity thrombosis 7 to 8 years ago), hiatal hernia status post Nissen.    1/2/25: An abdominal ultrasound was performed due to abdominal pain.  This was significant for a 2.5 x 2 cm lesion in the right lobe of the liver which was lobulated and hypoechoic.   1/9/25: CT abdomen performed shows a 22 mm enhancing lesion within the posterior segment of the right lobe of the liver that was nonspecific. Lobulated liver concerning for cirrhosis.  Biopsy was recommended and she was referred to hepatology at [facility] and was evaluated by Dr. Ortega.   3/3/25: She had a CT-guided liver biopsy of the 2.1 x 1.9 cm lesion in hepatic segment 5 and was positive for malignancy adenocarcinoma.  Immunoprofile was not specific and differential diagnosis for primary tumor sites would include pancreatic or biliary, upper GI, or TTF-1 negative lung primary.  3/13/25: CT chest no axillary, hilar, or mediastinal adenopathy.  3/19/25: Workup was performed to assess for primary site. EGD performed revealed mild chronic gastritis. Colonoscopy showed a one 3 mm polyp and one 5 mm polyp in the transverse colon significant for tubular adenoma.  Recommended repeating colonoscopy in 7 years. Mammogram obtained in January 2025  shows BIRADS 2.  4/2/25: PET/CT:  significant for liver lesion (not hypermetabolic as per notes) and without enlarged lymph nodes or other sites of disease.  4/23/25: She was evaluated by Dr. Santiago with surgical oncology.  It was felt that she may have a primary hepatic adenocarcinoma that is a cholangiocarcinoma however there is no distal biliary dilatation to this lesion which was felt not to be typical for cholangiocarcinoma.  Her CBD was markedly dilated but there is no obvious lesions in the pancreas and was felt to possibly be a benign finding seen long after cholecystectomy. She had a cholecystectomy at the age of 18 for duct stone requiring a drain for a week.  Overall it was felt that she may benefit from a partial liver resection to remove the lesion.  5/15/25: Dr. Santiago performed a exploratory laparotomy, lysis of adhesions from prior RUQ resection, segment 5/6 liver resection, creation of vascularized omental flap for coverage of liver resection defect.  There is no evidence of additional disease seen outside of the liver.  Liver was firm with MAC Vinh nodular cirrhosis.  The liver resection was noted to be technically challenging because of the firm liver with high portal venous pressure.  Pathology from partial hepatectomy reveals a well-differentiated adenocarcinoma (grade 1), 3 cm, small duct intrahepatic cholangiocarcinoma. Tumor focally extends beyond capsule to involve visceral peritoneal surface. Perineural invasion identified, no evidence of lymphovascular invasion, surgical margins of resection negative, background cirrhosis with ongoing steatohepatitis. pT3, pN not assigned as no nodes submitted or found. Tempus testing: MSI stable, TMB low, IDH1 positive, PDL1 negative. HER2 negative. Claudin 18: negative  Her hospital course was complicated by pneumonia and was discharged on 5/24/25.  It was recommended that patient be on Lovenox for 28 days from surgery and will end on 6/12/2025.    She continues to have abdominal pain and  intermittently takes narcotics. She has an appointment scheduled tomorrow to remove the rest of her staples.      Interval history:  Patient is here today for follow-up of intrahepatic cholangiocarcinoma.  She is also having her chemotherapy education with pharmacy today to go over her Xeloda that she will be starting.  She reports doing well since her last visit with us, but she was only able to complete 5 days of her Augmentin due to increased diarrhea.  However, she does state that symptoms are better from the pneumonia.  She is breathing better and she is weaned her oxygen down to 2 L nasal cannula.  Diarrhea is resolved.  Her wound has healed well and she has had her staples removed.  Denies any fever/chills or any concerning B symptoms today.  WBCs are WNL.  She is otherwise without any other new or specific complaints today.      The following portions of the patient's history were reviewed and updated as appropriate: allergies, current medications, past family history, past medical history, past social history, past surgical history and problem list.      Allergies   Allergen Reactions    Contrast Dye (Echo Or Unknown Ct/Mr) Anaphylaxis    Iodinated Contrast Media Anaphylaxis    Lyrica [Pregabalin] Anaphylaxis    Amoxicillin Nausea Only    Influenza Virus Vaccine Unknown - Low Severity     SITE SWELLING AND REDNESS    Zonisamide Unknown - High Severity         Past Medical History:   Diagnosis Date    Arthritis     Asthma     Celiac disease     Diabetes     Elevated cholesterol     Gastroparesis     GERD (gastroesophageal reflux disease)     History of transfusion     Hypertension     Kidney stones     Leaky heart valve     Migraines on Botox injections every three months      PONV (postoperative nausea and vomiting)     Stomach ulcer     Stroke (cerebrum)    DVT  Lupus anticoagulant         Past Surgical History:   Procedure Laterality Date    ABDOMINAL SURGERY      APPENDECTOMY      BLADDER SUSPENSION       sling and sling removal and mesh replaced(X3)    BRAVO PROCEDURE N/A 08/08/2018    Procedure: ESOPHAGOGASTRODUODENOSCOPY AND CORTEZ;  Surgeon: Mele Rahman MD;  Location:  [facility];  Service: Gastroenterology    CHOLECYSTECTOMY      COLONOSCOPY N/A 08/08/2018    Procedure: COLONOSCOPY;  Surgeon: Mele Rahman MD;  Location:  [facility];  Service: Gastroenterology    ENDOSCOPY      FRACTURE SURGERY Left     quiana  in femur     HIATAL HERNIA REPAIR N/A 09/26/2018    Procedure: HIATAL HERNIA REPAIR LAPAROSCOPIC;  Surgeon: Mele Rahman MD;  Location:  [facility];  Service: General    HYSTERECTOMY - LEONARD-BSO  11 YEARS AGO    KIDNEY STONE SURGERY  01/2021    LEG SURGERY      NISSEN FUNDOPLICATION N/A 09/26/2018    Procedure: NISSEN FUNDOPLICATION LAPAROSCOPIC;  Surgeon: Mele Rahman MD;  Location:  [facility];  Service: General    REPLACEMENT TOTAL KNEE Left 03/29/2023             Social History     Socioeconomic History    Marital status:    Tobacco Use    Smoking status: Never     Passive exposure: Never    Smokeless tobacco: Never   Vaping Use    Vaping status: Never Used   Substance and Sexual Activity    Alcohol use: Yes     Comment: occ    Drug use: No    Sexual activity: Defer   She is  and has two children. She denies of any tobacco use or significant alcohol consumption (4 to 5 drinks yearly).        Family History   Problem Relation Age of Onset    Hypertension Mother     Heart disease Mother     Cervical cancer Mother     Diabetes Mother     Stroke Mother     Hypertension Father     Heart disease Father     Cancer unknown Paternal Aunt     Lung cancer Maternal Grandmother     Diabetes Maternal Grandmother     Hypertension Maternal Grandmother    Brother - Multiple Myeloma         Current Outpatient Medications:     albuterol (PROVENTIL) (2.5 MG/3ML) 0.083% nebulizer solution, Take 2.5 mg by nebulization Every 6 (Six) Hours., Disp: , Rfl:     aspirin 81 MG EC tablet, Take 1 tablet by mouth  Daily., Disp: , Rfl:     B-D UF III MINI PEN NEEDLES 31G X 5 MM misc, Use to inject insulin four times daily, Disp: , Rfl:     capecitabine (Xeloda) 500 MG chemo tablet, Take 5 tablets by mouth 2 (Two) Times a Day on days 1-14, then off 7 days in each 21-day cycle.  Take within 30 minutes of a meal., Disp: 140 tablet, Rfl: 7    Continuous Glucose Sensor (Dexcom G7 Sensor) misc, Apply 1 each topically Every 10 (Ten) Days., Disp: , Rfl:     diazePAM (VALIUM) 10 MG tablet, Take 1 tablet by mouth Daily., Disp: , Rfl:     Farxiga 10 MG tablet, Take 10 mg by mouth Daily., Disp: , Rfl:     fluticasone (FLONASE) 50 MCG/ACT nasal spray, Administer 2 sprays into the nostril(s) as directed by provider Daily As Needed for Rhinitis., Disp: , Rfl:     gabapentin (NEURONTIN) 600 MG tablet, Take 1 tablet by mouth 3 (Three) Times a Day., Disp: , Rfl:     Insulin Glargine (Lantus SoloStar) 100 UNIT/ML injection pen, Inject 36 Units under the skin into the appropriate area as directed Every Morning., Disp: , Rfl:     Insulin Lispro, 1 Unit Dial, (HumaLOG KwikPen) 100 UNIT/ML solution pen-injector, Inject under the skin three times daily prior to meals per sliding scale, Disp: , Rfl:     Linzess 72 MCG capsule capsule, Take 1 capsule by mouth Every Other Day., Disp: , Rfl:     lisinopril (PRINIVIL,ZESTRIL) 10 MG tablet, Take 1 tablet by mouth Daily., Disp: , Rfl:     methocarbamol (ROBAXIN) 500 MG tablet, Take 1 tablet by mouth Daily., Disp: , Rfl:     montelukast (SINGULAIR) 10 MG tablet, Take 1 tablet by mouth Every Night., Disp: , Rfl:     naloxone (Narcan) 4 MG/0.1ML nasal spray, Administer 1 spray into the nostril(s) as directed by provider As Needed for Opioid Reversal. Call 911. Don't prime. [place] in 1 nostril for overdose. Repeat in 2-3 minutes in other nostril if no or minimal breathing/responsiveness., Disp: , Rfl:     OnabotulinumtoxinA 200 units reconstituted solution, Inject into appropriate area as directed by provider  every 3 months, Disp: , Rfl:     oxyCODONE (ROXICODONE) 5 MG immediate release tablet, Take 1 tablet by mouth every night at bedtime., Disp: , Rfl:     pantoprazole (PROTONIX) 40 MG EC tablet, Take 1 tablet by mouth Daily., Disp: , Rfl:     promethazine (PHENERGAN) 25 MG tablet, Take 1 tablet by mouth Every 8 (Eight) Hours As Needed., Disp: , Rfl:     Rimegepant Sulfate (Nurtec) 75 MG tablet dispersible tablet, Take 1 tablet by mouth Daily. (Patient taking differently: Take 1 tablet by mouth Daily As Needed.), Disp: 2 tablet, Rfl: 0    rOPINIRole (REQUIP) 2 MG tablet, Take 1 tablet by mouth Every Night., Disp: , Rfl:     sennosides-docusate (Senexon-S) 8.6-50 MG per tablet, Take 1 tablet by mouth Daily., Disp: , Rfl:     triamcinolone (KENALOG) 0.1 % cream, Apply topically to affected area as needed, Disp: , Rfl:   No current facility-administered medications for this visit.          Review of Systems  Constitutional: No fever, chills, night sweats or weight loss.   HEENT:  No headaches, vision changes or hearing changes, no sinus drainage, sore throat.   Cardiovascular:  No palpitations, chest pain, syncopal episodes or edema.   Pulmonary:  No shortness of breath, hemoptysis, or cough.   Gastrointestinal:  Positive nausea, no vomiting.  No constipation or diarrhea. Positive abdominal pain. No melena or hematochezia.   Genitourinary:  No hematuria, or changes in urination.   Musculoskeletal:  No pain, or joint problems.   Skin: No rashes or pruritus.   Endocrine:  Positive hot flashes and chills   Hematologic:  No history of free bleeding, spontaneous bleeding or clotting problems. No lymphadenopathy.    Immunologic:  Seasonal allergies, no frequent infections.   Neurologic: No numbness, tingling, or weakness.   Psychiatric:  No anxiety or depression.           Physical Exam  Vital Signs: These were reviewed and listed as per patient’s electronic medical chart  Vitals:    06/18/25 0841   BP: 132/79   Pulse: 81    Resp: 18   Temp: 97.3 °F (36.3 °C)   SpO2: 95%       General: Awake, alert and oriented, in no distress  HEENT: Head is atraumatic, normocephalic, extraocular movements full, no scleral icterus  Neck: supple, no jvd, lymphadenopathy or masses  Cardiovascular: regular rate and rhythm without murmurs, rubs or gallops  Pulmonary: non-labored, clear to auscultation bilaterally, no wheezing or rhonchi, patient wearing 2 L nasal cannula  Abdomen: soft, non-tender, non-distended, normal active bowel sounds present, no organomegaly, midline abdominal staples in place, appears to be well healed  Extremities: No clubbing, cyanosis or edema  Lymph: No cervical, supraclavicular adenopathy  Neurologic: Mental status as above, alert, awake and oriented, grossly non-focal exam  Skin: warm, dry, intact        Pain Score:  Pain Score    06/18/25 0841   PainSc: 0-No pain             PHQ-Score Total:  PHQ-9 Total Score:          IMAGING:  US abdomen complete (01/02/2025 11:37)   FINDINGS: There is a 2.5 x 2 cm lesion in the right lobe of the liver. This is lobulated and hypoechoic. This may represent a complex cyst. The gall bladder is absent.  The common duct is normal. The right kidney measures 11.5 cm in length and is normal in echogenicity without hydronephrosis. The left kidney measures 11.7 cm in length and is normal in echogenicity without hydronephrosis.  Spleen is unremarkable.  The aorta is normal in caliber. The vena cava is unremarkable.   IMPRESSION: Hypoechoic focus in the right lobe of the liver, may represent a complex cyst. Recommend infused CT to better characterize this. Otherwise, unremarkable abdominal ultrasound.     CT Abdomen Pelvis With Contrast (01/09/2025 15:25)   FINDINGS: ABDOMEN: The lung bases are clear. The heart is proper size. The liver is lobulated. There is a 22 mm enhancing lesion within the posterior segment of the right lobe of the liver, nonspecific. The gallbladder is absent. The spleen is  unremarkable. No adrenal mass is present.  The pancreas is unremarkable. The kidneys are unremarkable. The aorta is   proper caliber. Pre-contrast images demonstrate no evidence of nephrolithiasis. PELVIS: The appendix is not identified. The urinary bladder is unremarkable. There is no significant free fluid or adenopathy.   IMPRESSION: Nonspecific liver lesion, neoplasm not excluded. Recommend biopsy for further evaluation. Cirrhosis.     Mammo Screening Digital Tomosynthesis Bilateral With CAD (01/17/2025 11:49)   FINDINGS:  The breast parenchyma is heterogenously dense, which may obscure small masses. Benign-appearing calcifications. There are no suspicious masses, areas of architectural distortion or clustered microcalcifications.   BI-RADS CATEGORY: 2 , BENIGN FINDING(S).   RECOMMENDED FOLLOW-UP: 12M   FOLLOW UP SCREENING MAMMOGRAM IN ONE YEAR.     CT Chest With Contrast Diagnostic (03/13/2025 14:29)   FINDINGS: CHEST: There is no axillary adenopathy. There is no hilar or mediastinal adenopathy. Heart size is normal. There is no pericardial or pleural effusion. Limited images of the upper abdomen show prior cholecystectomy. Known hepatic lesion is poorly demonstrated on this exam. No suspicious infiltrate or nodule identified.   IMPRESSION: No acute process.     CT Abdomen Pelvis With Contrast (05/17/2025 15:40)   CT Chest With Contrast Diagnostic (05/17/2025 15:40)   FINDINGS: Chest: Lymph Nodes and Mediastinum: No lymphadenopathy by CT size criteria. Unchanged right cardiophrenic lymph nodes. No suspicious thyroid findings. Cardiovascular: The heart is normal in caliber. Thoracic great vessels are patent. Lungs and Pleura: Central airways are clear. Complete collapse of the right lower lobe. Partial atelectasis in the left lower lobe. Moderate right pleural effusion and small left pleural effusions. Musculoskeletal and Body Wall: No clearly aggressive bone lesions. Abdomen/Pelvis: Solid Abdominal Organs:  Surgical changes of hepatic segment 5/6 resection with surgical edema fluid and locules of gas along the resection margin. Prior cholecystectomy. Extrahepatic biliary ductal ectasia. Unremarkable spleen, pancreas, bilateral adrenal glands and kidneys. No hydronephrosis. GI Tract/Mesentery/Peritoneum: Nondistended stomach. Nasogastric tube tip in the mid stomach. Wall thickening of the distal stomach and duodenum, likely reactive. No bowel obstruction. Pelvic Viscera: Partially distended urinary bladder with catheter in place. Prior hysterectomy. Lymph Nodes/Vasculature: Few prominent lymph nodes near the harriet hepatis, unchanged. Portacaval 13 mm short axis lymph node. The aortoiliac vasculature is patent and normal in caliber. Splenoportal system appears patent. Free Fluid: Small volume perihepatic fluid containing locules of gas. Small volume free fluid in the pelvis. Musculoskeletal and Body Wall: Surgical changes of the midline abdomen.   IMPRESSION: Chest: Moderate right and small left pleural effusions with associated atelectasis and complete collapse of the right lower lobe. Abdomen/Pelvis: Surgical changes of hepatic segment 5/6 resection with perihepatic fluid and locules of gas along the resection margin and posterior to the liver. This may be postsurgical, related to superimposed infection or necrosis. Small volume fluid in the pelvis. No abdominal or pelvic abscess. Mild wall thickening of the distal stomach and descending duodenum, likely reactive.     CT Abdomen Pelvis Without Contrast (05/22/2025 10:50)   FINDINGS: Lower Chest: Large right and small left pleural effusions which appear similar to prior. Atelectasis of the right lower lobe. Analysis of the abdominopelvic viscera is limited by the absence of intravenous contrast material. Solid Abdominal Organs: Changes of segment V/VI liver resection with some adjacent hypodensity in the liver representing edema and fluid. Interval resolution of gas  locules along the collection margin. Prior cholecystectomy. No biliary ductal dilatation. Normal noncontrast appearance of the spleen, pancreas, and adrenal glands. No hydronephrosis. GI Tract/Mesentery/Peritoneum: The small large bowel are normal by caliber. No peritoneal free air. Diffuse mesenteric edema limits evaluation for focal inflammatory change. No mesenteric fluid collection. There is right upper quadrant stranding. Pelvic Viscera: No pelvic mass.Lymph Nodes/Vasculature: There are again multiple enlarged and borderline enlarged nodes in the periportal region and also a 12 mm short axis enlarged gastrohepatic lymph node (series 3 image 92). Free Fluid: Small volume free fluid, perihepatic along the paracolic gutters and pelvis. Musculoskeletal and Body Wall: No suspicious osseous lesions. Midline ventral surgical scar with small associated periincisional collection measuring up to 27 mm on series 3 image 163.   IMPRESSION: Changes of hepatic wedge resection with expected right upper quadrant and periduodenal stranding. No intra-abdominal or intrapelvic fluid collection. Small volume free fluid in the abdomen and pelvis.         LABS/STUDIES:  Office Visit on 06/18/2025   Component Date Value    Glucose 06/18/2025 190 (H)     BUN 06/18/2025 10.4     Creatinine 06/18/2025 0.56 (L)     Sodium 06/18/2025 140     Potassium 06/18/2025 3.6     Chloride 06/18/2025 106     CO2 06/18/2025 24.1     Calcium 06/18/2025 8.8     Total Protein 06/18/2025 6.9     Albumin 06/18/2025 3.7     ALT (SGPT) 06/18/2025 22     AST (SGOT) 06/18/2025 70 (H)     Alkaline Phosphatase 06/18/2025 145 (H)     Total Bilirubin 06/18/2025 0.3     Globulin 06/18/2025 3.2     A/G Ratio 06/18/2025 1.2     BUN/Creatinine Ratio 06/18/2025 18.6     Anion Gap 06/18/2025 9.9     eGFR 06/18/2025 104.0     WBC 06/18/2025 8.17     RBC 06/18/2025 4.12     Hemoglobin 06/18/2025 11.7 (L)     Hematocrit 06/18/2025 38.2     MCV 06/18/2025 92.7     MCH  06/18/2025 28.4     MCHC 06/18/2025 30.6 (L)     RDW 06/18/2025 13.4     RDW-SD 06/18/2025 45.5     MPV 06/18/2025 10.3     Platelets 06/18/2025 176     Neutrophil % 06/18/2025 53.2     Lymphocyte % 06/18/2025 34.5     Monocyte % 06/18/2025 7.5     Eosinophil % 06/18/2025 3.7     Basophil % 06/18/2025 0.7     Immature Grans % 06/18/2025 0.4     Neutrophils, Absolute 06/18/2025 4.35     Lymphocytes, Absolute 06/18/2025 2.82     Monocytes, Absolute 06/18/2025 0.61     Eosinophils, Absolute 06/18/2025 0.30     Basophils, Absolute 06/18/2025 0.06     Immature Grans, Absolute 06/18/2025 0.03     nRBC 06/18/2025 0.0    Office Visit on 06/05/2025   Component Date Value    Glucose 06/05/2025 189 (H)     BUN 06/05/2025 8.5     Creatinine 06/05/2025 0.55 (L)     Sodium 06/05/2025 137     Potassium 06/05/2025 3.9     Chloride 06/05/2025 102     CO2 06/05/2025 21.9 (L)     Calcium 06/05/2025 9.3     Total Protein 06/05/2025 8.4     Albumin 06/05/2025 4.0     ALT (SGPT) 06/05/2025 24     AST (SGOT) 06/05/2025 119 (H)     Alkaline Phosphatase 06/05/2025 182 (H)     Total Bilirubin 06/05/2025 0.4     Globulin 06/05/2025 4.4     A/G Ratio 06/05/2025 0.9     BUN/Creatinine Ratio 06/05/2025 15.5     Anion Gap 06/05/2025 13.1     eGFR 06/05/2025 104.4     ALPHA-FETOPROTEIN 06/05/2025 2.33     CEA 06/05/2025 0.94     Vitamin B-12 06/05/2025 639     Dilute Prothrombin Time(* 06/05/2025 33.6     dPT Confirm Ratio 06/05/2025 1.04     Thrombin Time 06/05/2025 20.6     PTT Lupus Anticoagulant 06/05/2025 37.8     Dilute Viper Venom Time 06/05/2025 37.0     Lupus Anticoagulant Refl* 06/05/2025 Comment:     Beta-2 Glyco 1 IgG 06/05/2025 <9     Beta-2 Glyco 1 IgA 06/05/2025 <9     Beta-2 Glyco 1 IgM 06/05/2025 <9     Anticardiolipin IgG 06/05/2025 10     Anticardiolipin IgM 06/05/2025 <9     Folate, Hemolysate 06/05/2025 448.0     Hematocrit 06/05/2025 42.2     RBC Folate 06/05/2025 1062     WBC 06/05/2025 11.55 (H)     RBC 06/05/2025 4.60      Hemoglobin 06/05/2025 12.9     Hematocrit 06/05/2025 41.4     MCV 06/05/2025 90.0     MCH 06/05/2025 28.0     MCHC 06/05/2025 31.2 (L)     RDW 06/05/2025 13.2     RDW-SD 06/05/2025 43.1     MPV 06/05/2025 11.0     Platelets 06/05/2025 251     Neutrophil % 06/05/2025 59.1     Lymphocyte % 06/05/2025 30.9     Monocyte % 06/05/2025 6.3     Eosinophil % 06/05/2025 2.3     Basophil % 06/05/2025 0.8     Immature Grans % 06/05/2025 0.6 (H)     Neutrophils, Absolute 06/05/2025 6.83     Lymphocytes, Absolute 06/05/2025 3.57 (H)     Monocytes, Absolute 06/05/2025 0.73     Eosinophils, Absolute 06/05/2025 0.26     Basophils, Absolute 06/05/2025 0.09     Immature Grans, Absolute 06/05/2025 0.07 (H)     nRBC 06/05/2025 0.0          PATHOLOGY:   3/3/25 CT GUIDED LIVER BIOPSY AT              3/19/25 COLONOSCOPY AT         5/15/25 PARTIAL HEPATECTOMY AT                 Assessment & Plan   Esme Santos is a very pleasant 60 y.o.  female who presents in new consultation at the request of Dr. Dwayne Santiago for further management and evaluation of intrahepatic cholangiocarcinoma.    Intrahepatic cholangiocarcinoma (pT3, NX, M0)  - Diagnosed via CT guided biopsy with adenocarcinoma in March 2025 and is s/p segment 5/6 liver resection with Dr. Santiago on 5/15/25. Pathology was significant for a small duct intrahepatic pT3 cholangiocarcinoma, 3 cm, with negative margins. AFP prior to surgery was normal at <2.3. Tempus testing: MSI stable, TMB low, IDH1 positive, PDL1 negative. HER2 negative. Claudin 18: negative.  - She was evaluated by Dr. Santiago post surgical resection and recommended adjuvant chemotherapy and opted for adjuvant treatment locally.   - Dr. Lyn spent some time reviewing her pathology and imaging results as well as pathology, staging, and prognosis as well as recommended adjuvant treatment.   - Dr. Lyn would recommend adjuvant capecitabine 1,250 mg/m2 po twice daily on days 1-14 every 21 days for six  months as adjuvant chemotherapy has been associated with overall survival benefit based on the BILCAP study. Patient should be routinely evaluated for toxicities to assess whether she will require dose adjustment. If patient develops progression or metastatic disease would recommend comprehensive molecular testing.    - Dr. Lyn has discussed the above recommendations at length with Ms. Santos and she would like to proceed with adjuvant treatment after we discussed the possible risks, benefits, and toxicities of treatment.  - Reevaluated wound today.  She has had staples removed.  Wound is healing well.  No fevers/chills or any concerning B symptoms today.  - Breathing is much better.  Lung exam unremarkable.  She is down to 2 L nasal cannula.  She only finished 5 days of her Augmentin due to diarrhea, and diarrhea has now resolved.  She will see pulmonology on 8/7 as a new patient.  - Capecitabine teach today per pharmacy.  Plan to start patient on 6/26.    Surveillance  - Would recommend CT vs MRI abdomen and pelvis as well as CT chest with IV contrast every 3 to 6 months for two years, then every 6 to 12 months for up to 5 years. She is allergic to IV contrast and will require premedications for imaging.     Lupus anticoagulant  - Ms. Santos reports being told in the past that she has lupus anticoagulant which makes her at risk for clots.  - She has no family history significant for recurrent thrombosis or recurrent miscarriages.  She was diagnosed with a right upper extremity clot 2 days after being discharged from the hospital in her 40s and was on anticoagulation for a short period of time.  Several years after that she developed a blood clot in her lungs and at that time was started on Coumadin and remained on the medication for several years before stopping the medication with her hematologist due to excess bleeding.  Since that time patient has had knee surgery as well as fracture repair and was  reportedly at risk received a postop Lovenox for 1 week given her history of lupus anticoagulant.  - She was evaluated by hematology at [facility] for possible antiphospholipid syndrome concern.  It was felt that there was no convincing evidence that patient currently has a hypercoagulable state and recommended Lovenox at prophylactic dose while inpatient and has continued for 1 month postsurgery.    URI  Shortness of breath  - She was previously following with pulmonary and is now on supplemental oxygen.  Referral was placed to pulmonology and she will see them on 8/7. She has a prior history significant for asthma.   - Breathing is much better.  Lung exam unremarkable today.  She is down to 2 L nasal cannula.  She only finished 5 days of her Augmentin due to diarrhea, and diarrhea has now resolved.     Folate deficiency history  - Folate and B12 at last visit were replete.      ACO / DEANA/Other  Quality measures  -  Esme Santos did not receive 2024 flu vaccine.  This was recommended.  -  Esme Santos reports a pain score of 0.    -  Current outpatient and discharge medications have been reconciled for the patient.  Reviewed by: AROLDO Maxwell        Patient will return in follow up appointment with MD as planned with CBC and CMP on 7/17.  This will be 3 weeks after starting treatment.  She understands that should she have any questions or concerns prior to her appointment she should give us a call at any time and I would be happy to see her sooner. It was a pleasure to see this patient in clinic today, thank you for allowing me to participate in the care of this patient.    I spent 30 minutes caring for patient on this date of service. This time includes time spent by me in the following activities: preparing for the visit, reviewing tests, obtaining and/or reviewing a separately obtained history, performing a medically appropriate examination and/or evaluation, counseling and educating the  patient/family/caregiver, ordering medications, tests, or procedures, documenting information in the medical record, independently interpreting results and communicating that information with the patient/family/caregiver, and care coordination as well as answering any questions.        Kerry Hernadez, AROLDO   06/18/25   09:52 EDT

## 2025-06-18 NOTE — PROGRESS NOTES
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  An initial outreach was conducted, including assessment of therapy appropriateness and specialty medication education for Xeloda. The patient was introduced to services offered by [facility], including: regular assessments, refill coordination, curbside pick-up or mail order delivery options, prior authorization maintenance, and financial assistance programs as applicable. The patient was also provided with contact information for the pharmacy team.     Insurance Coverage & Financial Support  [place] Medicaid     Relevant Past Medical History and Comorbidities  Relevant medical history and concomitant health conditions were discussed with the patient. The patient's chart has been reviewed for relevant past medical history and comorbid conditions and updated as necessary.  Past Medical History:   Diagnosis Date    Arthritis     Asthma     Celiac disease     Diabetes     Elevated cholesterol     Gastroparesis     GERD (gastroesophageal reflux disease)     History of transfusion     Hypertension     Kidney stones     Leaky heart valve     Migraines     PONV (postoperative nausea and vomiting)     Stomach ulcer     Stroke (cerebrum)      Social History     Socioeconomic History    Marital status:    Tobacco Use    Smoking status: Never     Passive exposure: Never    Smokeless tobacco: Never   Vaping Use    Vaping status: Never Used   Substance and Sexual Activity    Alcohol use: Yes     Comment: occ    Drug use: No    Sexual activity: Defer       Problem list reviewed by Nile Snyder RPH on 6/19/2025 at  3:40 PM    Allergies  Known allergies and reactions were discussed with the patient. The patient's chart has been reviewed for  " allergy information and updated as necessary.   Allergies   Allergen Reactions    Contrast Dye (Echo Or Unknown Ct/Mr) Anaphylaxis    Iodinated Contrast Media Anaphylaxis    Lyrica [Pregabalin] Anaphylaxis    Amoxicillin Nausea Only    Influenza Virus Vaccine Unknown - Low Severity     SITE SWELLING AND REDNESS    Zonisamide Unknown - High Severity       Allergies reviewed by Nile Snyder [facility] on 6/18/2025 at  9:14 AM    Relevant Laboratory Values  Relevant laboratory values were discussed with the patient. The following specialty medication dose adjustment(s) are recommended: N/A  Lab Results   Component Value Date    GLUCOSE 190 (H) 06/18/2025    CALCIUM 8.8 06/18/2025     06/18/2025    K 3.6 06/18/2025    CO2 24.1 06/18/2025     06/18/2025    BUN 10.4 06/18/2025    CREATININE 0.56 (L) 06/18/2025    EGFRIFNONA 64 09/26/2018    BCR 18.6 06/18/2025    ANIONGAP 9.9 06/18/2025     No results found for: \"CHOL\", \"CHLPL\", \"TRIG\", \"HDL\", \"LDL\", \"LDLDIRECT\"      Current Medication List  This medication list has been reviewed with the patient and evaluated for any interactions or necessary modifications/recommendations, and updated to include all prescription medications, OTC medications, and supplements the patient is currently taking.  This list reflects what is contained in the patient's profile, which has also been marked as reviewed to communicate to other providers it is the most up to date version of the patient's current medication therapy.     Current Outpatient Medications:     albuterol (PROVENTIL) (2.5 MG/3ML) 0.083% nebulizer solution, Take 2.5 mg by nebulization Every 6 (Six) Hours., Disp: , Rfl:     aspirin 81 MG EC tablet, Take 1 tablet by mouth Daily., Disp: , Rfl:     B-D UF III MINI PEN NEEDLES 31G X 5 MM misc, Use to inject insulin four times daily, Disp: , Rfl:     Continuous Glucose Sensor (Dexcom G7 Sensor) misc, Apply 1 each topically Every 10 (Ten) Days., Disp: , Rfl:     diazePAM " (VALIUM) 10 MG tablet, Take 1 tablet by mouth Daily., Disp: , Rfl:     Farxiga 10 MG tablet, Take 10 mg by mouth Daily., Disp: , Rfl:     fluticasone (FLONASE) 50 MCG/ACT nasal spray, Administer 2 sprays into the nostril(s) as directed by provider Daily As Needed for Rhinitis., Disp: , Rfl:     gabapentin (NEURONTIN) 600 MG tablet, Take 1 tablet by mouth 3 (Three) Times a Day., Disp: , Rfl:     Insulin Glargine (Lantus SoloStar) 100 UNIT/ML injection pen, Inject 36 Units under the skin into the appropriate area as directed Every Morning., Disp: , Rfl:     Insulin Lispro, 1 Unit Dial, (HumaLOG KwikPen) 100 UNIT/ML solution pen-injector, Inject under the skin three times daily prior to meals per sliding scale, Disp: , Rfl:     Linzess 72 MCG capsule capsule, Take 1 capsule by mouth Every Other Day., Disp: , Rfl:     lisinopril (PRINIVIL,ZESTRIL) 10 MG tablet, Take 1 tablet by mouth Daily., Disp: , Rfl:     methocarbamol (ROBAXIN) 500 MG tablet, Take 1 tablet by mouth Daily., Disp: , Rfl:     montelukast (SINGULAIR) 10 MG tablet, Take 1 tablet by mouth Every Night., Disp: , Rfl:     naloxone (Narcan) 4 MG/0.1ML nasal spray, Administer 1 spray into the nostril(s) as directed by provider As Needed for Opioid Reversal. Call 911. Don't prime. Hanna in 1 nostril for overdose. Repeat in 2-3 minutes in other nostril if no or minimal breathing/responsiveness., Disp: , Rfl:     OnabotulinumtoxinA 200 units reconstituted solution, Inject into appropriate area as directed by provider every 3 months, Disp: , Rfl:     pantoprazole (PROTONIX) 40 MG EC tablet, Take 1 tablet by mouth Daily., Disp: , Rfl:     promethazine (PHENERGAN) 25 MG tablet, Take 1 tablet by mouth Every 8 (Eight) Hours As Needed., Disp: , Rfl:     Rimegepant Sulfate (Nurtec) 75 MG tablet dispersible tablet, Take 1 tablet by mouth Daily. (Patient taking differently: Take 1 tablet by mouth Daily As Needed.), Disp: 2 tablet, Rfl: 0    rOPINIRole (REQUIP) 2 MG  tablet, Take 1 tablet by mouth Every Night., Disp: , Rfl:     sennosides-docusate (Senexon-S) 8.6-50 MG per tablet, Take 1 tablet by mouth Daily., Disp: , Rfl:     triamcinolone (KENALOG) 0.1 % cream, Apply topically to affected area as needed, Disp: , Rfl:     capecitabine (Xeloda) 500 MG chemo tablet, Take 5 tablets by mouth 2 (Two) Times a Day on days 1-14, then off 7 days in each 21-day cycle.  Take within 30 minutes of a meal., Disp: 140 tablet, Rfl: 7    oxyCODONE (ROXICODONE) 5 MG immediate release tablet, Take 1 tablet by mouth every night at bedtime., Disp: , Rfl:   No current facility-administered medications for this visit.    Medicines reviewed by Nile Snyder [facility] on 6/18/2025 at  9:29 AM    Drug Interactions  Pharmacologic effects of capecitabine may be decreased by proton pump inhibitors (eg, Proton Pump Inhibitors).  Pt will be changed from Protonix to Famotidine 20mg po bid per Dr. Lyn as it does not interact with the Xeloda.    Educated pt on CNS depression risk with diazepam and oxycodone.    Initial Education Provided for Specialty Medication  The patient has been provided with the following education in verbal and written form. All questions and concerns have been addressed prior to the patient receiving the medication, and the patient has verbalized comprehension of the education and any materials provided. Additional patient education shall be provided and documented upon request by the patient, provider, or payer.      TOPICS COMMENTS   Storage and Handling of Oral Specialty Medication Store in the original container, in a dry location out of direct sunlight, and out of reach of children or pets.  Discussed safe handling and what to do with any unused medication.   Administration of Oral Specialty Medication 5 tablets by mouth 2 (Two) Times a Day on days 1-14, then off 7 days in each 21-day cycle.  Take within 30 minutes of a meal and with a glass of water.   Adherence to Oral  Specialty Regimen and Handling Missed Doses Patient is likely to have good treatment adherence; reinforced the importance of adherence. Reviewed how to address missed doses and encouraged the patient to let us know of any missed doses.   Anemia: role of RBC, cause, s/s, ways to manage, role of transfusion Reviewed the role of RBC and the use of transfusions if hemoglobin decreases too much.  Patient to notify us if she experiences shortness of breath, dizziness, or palpitations.  Also let patient know that she could feel more tired than usual and to try to stay active, but rest if she needs to.    Thrombocytopenia: role of platelet, cause, s/s, ways to prevent bleeding, things to avoid, when to seek help Reviewed the role of platelets in blood clotting and when to call clinic (bloody nose that bleeds for 5 minutes despite pressure, a cut that won't stop bleeding despite pressure, gums that bleed excessively with brushing or flossing). Recommended using an electric razor, soft bristle toothbrush, and blowing your nose gently.    Neutropenia: role of WBC, cause, infection precautions, s/s of infection, when to call MD Reviewed the role of WBC, good infection prevention practices, and when to call the clinic (temperature 100.4F, sore throat, burning urination, etc).   Nutrition and Appetite Changes:  importance of maintaining healthy diet & weight, ways to manage to improve intake, dietary consult, exercise regimen, electrolyte and/or blood glucose abnormalities Drink 8-10 glasses of water or fluid each day unless your care provider has instructed you to limit your fluid intake.  Eat small, frequent meals throughout the day rather than a few large meals.  Eat bland, low-fiber foods (e.g., bananas, applesauce, potatoes, chicken, rice, toast).  Avoid high-fiber foods (e.g., raw vegetables, raw fruits, whole grains).  Avoid foods that cause gas (e.g., broccoli, beans).  Avoid lactose-containing foods (e.g., yogurt, milk).   Avoid spicy, fried, and greasy foods   Diarrhea: causes, s/s of dehydration, ways to manage, dietary changes, when to call MD Discussed the risk of diarrhea. Instructed patient on use OTC loperamide with diarrhea onset, but emphasized the importance of calling the clinic if 4-6 episodes in 24 hours not relieved by OTC loperamide.   Constipation: causes, ways to manage, dietary changes, when to call MD Provided supplementary handout with instructions for use of docusate and other OTC therapies to manage constipation.  Patient was instructed to call us if medications aren't working.   Nausea/Vomiting: cause, use of antiemetics, dietary changes, when to call MD Emetic risk: Low  Premeds: N/A  Scheduled meds: N/A  PRN home meds: Ondansetron 8mg PO every 8 hours PRN nausea / vomiting  Pharmacy home meds sent to: [facility]    Instructed the patient to take a dose of the PRN medication at the first onset of nausea and if it's not working to call us for additional medications.  Also provided non-drug measures to mitigate nausea.   Mouth Sores: causes, oral care, ways to manage Mouth sores can be prevented by making a mouth wash mixture of salt, baking soda, and water. The patient was instructed to swish and spit four times daily after meals and before bedtime.  Use of a soft bristle toothbrush was recommended.  The patient was instructed to avoid alcohol-containing OTC mouthwashes.    Alopecia: cause, ways to manage, resources Discussed the possibility of hair loss with the patient but that it is not commonly associated with Xeloda.   Nervous System Changes: causes, s/s, neuropathies, cognitive changes, ways to manage Discussed that Xeloda can cause Fatigue and that the patient should stay as active as possible, but that it is okay to rest as needed.  The patient should avoid operating machinery if she feels too tired.   Pain: causes, ways to manage Discussed that Xeloda can cause abdominal pain/discomfort.   Skin/Nail Changes:  cause, s/s, ways to manage Capecitabine may cause a rash, which means that your skin may be red, dry, itchy, or cracked. Check your skin, and call your care team if you notice any skin changes  Hand-and-foot syndrome (HFS) is a skin reaction that appears on the palms of the hands and soles of the feet. It can appear as a rash, peeling skin, or a “pins and needles” sensation. Let your care provider know right away if you experience this side effect.   Organ Toxicities: cause, s/s, need for diagnostic tests, labs, when to notify MD Discussed potential effects on organ systems, monitoring, diagnostic tests, labs, and when to notify the MD. Discussed the signs/symptoms of the following: cardiotoxicity, hepatotoxicity, nephrotoxicity, and skin changes.   Survivorship: distress, distress assessment, secondary malignancies, early/late effects, follow-up, social issues, social support N/A   Miscellaneous Financial Issues: N/A  Lab Draws: On or before day 1 of each cycle, no sooner than 3 days early.   Infertility and Sexuality:  causes, fertility preservation options, sexuality changes, ways to manage, importance of birth control Oral Oncology Therapy: Reviewed safe sex practices and the importance of minimizing exposure to body fluids while on oral oncology therapy.  The patient had a hysterectomy previously and is not of childbearing potential.   Home Care: how to manage bodily fluids Counseled on management of soiled linens and proper flush technique.  Discussed how to manage all the side effects at home and advised when to contact the MD office       Adherence and Self-Administration  Adherence related to the patient's specialty therapy was discussed with the patient. The Adherence segment of this outreach has been reviewed and updated.     Is there a concern with patient's ability to self administer the medication correctly and without issue?: No  Were any potential barriers to adherence identified during the initial  assessment or patient education?: No  Are there any concerns regarding the patient's understanding of the importance of medication adherence?: No  Methods for Supporting Patient Adherence and/or Self-Administration: N/A    Open Medication Therapy Problems  No medication therapy recommendations to display    Goals of Therapy  Goals related to the patient's specialty therapy were discussed with the patient. The Patient Goals segment of this outreach has been reviewed and updated.   Goals Addressed Today        Specialty Pharmacy General Goal      To prevent disease progression            Reassessment Plan & Follow-Up  1. Medication Therapy Changes: ON 6/26/25, start Xeloda 500mg tablets; take 5 tablets by mouth 2 (Two) Times a Day on days 1-14, then off 7 days in each 21-day cycle.  Take within 30 minutes of a meal.  2. Related Plans, Therapy Recommendations, or Therapy Problems to Be Addressed:  -Recommended Protonix be changed to Pepcid due to drug interaction between Protonix and Xeloda.  Recommendation accepted.   Pharmacist to perform regular assessments no more than (6) months from the previous assessment.  3. Care Coordinator to set up future refill outreaches, coordinate prescription delivery, and escalate clinical questions to pharmacist.  4. Welcome information and patient satisfaction survey to be sent by specialty pharmacy team with patient's initial fill.    Attestation  Therapeutic appropriateness: Appropriate   I attest the patient was actively involved in and has agreed to the above plan of care. If the prescribed therapy is at any point deemed not appropriate based on the current or future assessments, a consultation will be initiated with the patient's specialty care provider to determine the best course of action. The revised plan of therapy will be documented along with any required assessments and/or additional patient education provided.     Robert Snyder, PharmD  Clinical Specialty Pharmacist,  Oncology  6/18/25

## 2025-06-19 RX ORDER — FAMOTIDINE 20 MG/1
20 TABLET, FILM COATED ORAL 2 TIMES DAILY
Qty: 60 TABLET | Refills: 5 | Status: SHIPPED | OUTPATIENT
Start: 2025-06-19

## 2025-06-20 NOTE — PROGRESS NOTES
Specialty Pharmacy Patient Management Program  Double Check     Patient will be filling or currently fills medications at [facility] and is enrolled in the Patient Management Program while taking Xeloda (capecitabine).    Requested Prescriptions     Signed Prescriptions Disp Refills    capecitabine (Xeloda) 500 MG chemo tablet 140 tablet 7     Sig: Take 5 tablets by mouth 2 (Two) Times a Day on days 1-14, then off 7 days in each 21-day cycle.  Take within 30 minutes of a meal.    famotidine (Pepcid) 20 MG tablet 60 tablet 5     Sig: Take 1 tablet by mouth 2 (Two) Times a Day.     Prescription orders above were sent to the pharmacy per Collaborative Care Agreement Protocol.     Completed independent double check on medication order/RX.    Guillermina Sousa PharmD  06/20/2507:32 EDT

## 2025-06-23 ENCOUNTER — SPECIALTY PHARMACY (OUTPATIENT)
Dept: CARDIOLOGY | Facility: HOSPITAL | Age: 60
End: 2025-06-23
Payer: MEDICAID

## 2025-06-23 DIAGNOSIS — C22.1 INTRAHEPATIC CHOLANGIOCARCINOMA: Primary | ICD-10-CM

## 2025-06-23 RX ORDER — ONDANSETRON 8 MG/1
8 TABLET, FILM COATED ORAL 3 TIMES DAILY PRN
Qty: 30 TABLET | Refills: 5 | Status: SHIPPED | OUTPATIENT
Start: 2025-06-23

## 2025-06-23 NOTE — PROGRESS NOTES
Specialty Pharmacy Patient Management Program  Medication Management Clinic Refill Outreach      Esme was contacted today regarding refills of her medication(s).    Specialty medication(s) and dose(s) confirmed: Xeloda, Zofran, Pepcid initial fills      Delivery Questions      Flowsheet Row Most Recent Value   Delivery method UPS   Delivery address verified with patient/caregiver? Yes   Delivery address Home   Number of medications in delivery 3   Medication(s) being filled and delivered Ondansetron HCl (ZOFRAN), Famotidine (PEPCID), Capecitabine (XELODA)   Copay verified? Yes   Copay amount $0   Copay form of payment No copayment ($0)   Delivery Date Selection 06/24/25   Signature Required Yes   Do you consent to receive electronic handouts?  No          Pt asked if it would be ok to take tylenol and Vicks Vapoinhaler with the Xeloda.  Ran drug interaction check and these medications do not interact with the Xeloda.    Follow-Up: 21 days    Nile Snyder RPH  6/23/2025  11:29 EDT

## 2025-07-08 ENCOUNTER — TELEPHONE (OUTPATIENT)
Dept: CARDIOLOGY | Facility: HOSPITAL | Age: 60
End: 2025-07-08
Payer: MEDICAID

## 2025-07-08 NOTE — TELEPHONE ENCOUNTER
"This patient called today and stated that she finishes her first cycle of Xeloda tomorrow.  Pt states that she has been experiencing severe fatigue along with abdominal cramping and nausea.  Pt denies any vomiting but reports that she does have dry heaving.  The pt states that the prn zofran hasn't helped but she has found some relief with promethazine that she already had on hand.  Of note, pt described the fatigue as \"feeling like she was hit by a truck.\"    Sending the above information to Dr. Lyn for review.  Pt's F/u appt with oncology is on the first day of cycle 2, 7/17/25.    Nile Snyder [facility]  07/08/25  16:30 EDT    "

## 2025-07-09 ENCOUNTER — TRANSCRIBE ORDERS (OUTPATIENT)
Dept: LAB | Facility: HOSPITAL | Age: 60
End: 2025-07-09
Payer: MEDICAID

## 2025-07-09 ENCOUNTER — TELEPHONE (OUTPATIENT)
Dept: ONCOLOGY | Facility: CLINIC | Age: 60
End: 2025-07-09
Payer: MEDICAID

## 2025-07-09 ENCOUNTER — HOSPITAL ENCOUNTER (OUTPATIENT)
Facility: HOSPITAL | Age: 60
Discharge: HOME | End: 2025-07-09
Admitting: FAMILY MEDICINE
Payer: MEDICAID

## 2025-07-09 ENCOUNTER — SPECIALTY PHARMACY (OUTPATIENT)
Dept: CARDIOLOGY | Facility: HOSPITAL | Age: 60
End: 2025-07-09
Payer: MEDICAID

## 2025-07-09 DIAGNOSIS — R10.84 GENERALIZED ABDOMINAL PAIN: ICD-10-CM

## 2025-07-09 DIAGNOSIS — R10.84 GENERALIZED ABDOMINAL PAIN: Primary | ICD-10-CM

## 2025-07-09 PROCEDURE — 74176 CT ABD & PELVIS W/O CONTRAST: CPT

## 2025-07-09 RX ORDER — PROCHLORPERAZINE MALEATE 10 MG
10 TABLET ORAL EVERY 6 HOURS PRN
Qty: 120 TABLET | Refills: 3 | Status: SHIPPED | OUTPATIENT
Start: 2025-07-09

## 2025-07-09 NOTE — TELEPHONE ENCOUNTER
Pt called and let me know that she was changed from lisinopril to losartan 25mg po daily. This has already been added to home med list.  Removed lisinopril from home med list.  Drug interaction check ran and there are no drug interactions between losartan and Xeloda.    Pt states that Dr. Lyn's office called her this morning after I forwarded them her reported side effects and that Dr. Lyn sent in some compazine to take prn.  Drug interaction check ran and there are no drug interactions between compazine and Xeloda.  Dr. Lyn's RN instructed pt to take zofran and compazine around the clock, scheduling them and rotating between the two. RN also verbalized that pt must NOT take Phenergan with the Zofran and Compazine.    Of note, pt states today that she has had diarrhea last 3 days but hasn't taken any medication for it (pt did not mention this during yesterday's call).  Instructed patient to use OTC loperamide with diarrhea onset, but emphasized the importance of calling the clinic if 4-6 episodes in 24 hours not relieved by OTC loperamide.  Pt states that Dr. Lyn's office also asked pt to come in for visit if the diarrhea doesn't subside.    Lastly, pt states that she is having a CT scan completed today due to abdominal pain/tenderness.  Dr. Lyn's office is aware of this and will obtain results once performed.    Nile Snyder RPH  07/09/25  11:49 EDT

## 2025-07-09 NOTE — PROGRESS NOTES
Specialty Pharmacy Patient Management Program  Refill Outreach     Esme was contacted today regarding refills of their medication(s).    Refill Questions      Flowsheet Row Most Recent Value   Changes to allergies? No   Changes to medications? Yes  [PharmD went over with patient]   New conditions or infections since last clinic visit No   Unplanned office visit, urgent care, ED, or hospital admission in the last 4 weeks  Yes  [visit for abdomanal pain PharmD aware and addresses]   How does patient/caregiver feel medication is working? Very good   Financial problems or insurance changes  No   Since the previous refill, were any specialty medication doses or scheduled injections missed or delayed?  No   Does this patient require a clinical escalation to a pharmacist? No            Delivery Questions      Flowsheet Row Most Recent Value   Delivery method [employer]   Delivery address verified with patient/caregiver? Yes   Delivery address Home   Other address preferred N/A   Number of medications in delivery 2   Medication(s) being filled and delivered Capecitabine (XELODA), Ondansetron HCl (ZOFRAN)   Doses left of specialty medications 0   Copay verified? Yes   Copay amount $0.00   Copay form of payment No copayment ($0)   Delivery Date Selection 07/11/25   Signature Required Yes   Do you consent to receive electronic handouts?  No                 Follow-up: 14 day(s)     Brunilda Rosenthal, Pharmacy Technician  7/9/2025  12:45 EDT

## 2025-07-09 NOTE — TELEPHONE ENCOUNTER
RN called to check on patient, as she relayed to pharmacy team she had been having some symptoms.     Esme stated she had not been experiencing these symptoms since she started Xeloda, that this had only started within the last 3-4 days.    Esme stated she was experiencing significant nausea, she stated she was unable to vomit, so she had been dry heaving until she developed significant pain/tenderness in her abdomen and spreading into her back.  Esme also stated she was having diarrheal episodes, approximately 5-6 in a 24 hour period, she did state however, that had seemed to ease up some today.  Esme stated she had contacted [facility] [place] in [place] and they ordered a CT Abdomen/Pelvis, she said she was hoping to get that completed today or tomorrow.  RN relayed that the clinic would obtain those results once it was performed.    RN discussed Esme's nausea medication regimen, she stated the Zofran wasn't working for her, so she had been taking some Phenergan she had left over which helped some.  RN discussed adding in Compazine with the Zofran, and instructed Esme to take these medications around the clock, scheduling them and rotating between the two.  RN also verbalized that Esme must NOT take Phenergan with the Zofran and Compazine, and Esme stated that she would not.  Compazine was sent to patient's requested pharmacy.      RN discussed Esme's fluid intake and she stated she would try to drink more fluids and be aware of staying hydrated. RN offered for Esme to come into the clinic for an acute visit and stool studies, however, she wanted to obtain the CT imaging first.  RN instructed Esme to call the clinic back either tomorrow or Friday if the new nausea medication regimen hadn't improved her nausea and/or if the diarrheal episodes had not improved.  RN stated again that patient could be brought into the clinic for an acute visit, stool studies, and IV fluid hydration, Esme again stated she  would wait and see how she felt tomorrow.    Esme verbalized understanding in regard to all topics discussed and had no further questions/concerns at this time.

## 2025-07-10 DIAGNOSIS — C22.1 INTRAHEPATIC CHOLANGIOCARCINOMA: ICD-10-CM

## 2025-07-10 DIAGNOSIS — E53.8 FOLATE DEFICIENCY: Primary | ICD-10-CM

## 2025-07-10 NOTE — PROGRESS NOTES
Esme Santos  [id number]  1965 7/17/2025      Referring Provider:   Dwayne Santiago MD    Reason for Follow Up:   Intrahepatic cholangiocarcinoma    Chief Complaint:  Intrahepatic cholangiocarcinoma      History of Present Illness   Esme Santos is a very pleasant 60 y.o.  female who presents in follow up appointment for further management and evaluation of intrahepatic cholangiocarcinoma.    Ms. Santos has a past medical history significant for diabetes mellitus complicated by gastroparesis (was to have stimulator placed) and peripheral neuropathy, lupus anticoagulant (previously on Coumadin, prior stroke more than 20 years ago, prior right upper extremity thrombosis 7 to 8 years ago), hiatal hernia status post Nissen.    1/2/25: An abdominal ultrasound was performed due to abdominal pain.  This was significant for a 2.5 x 2 cm lesion in the right lobe of the liver which was lobulated and hypoechoic.   1/9/25: CT abdomen performed shows a 22 mm enhancing lesion within the posterior segment of the right lobe of the liver that was nonspecific. Lobulated liver concerning for cirrhosis.  Biopsy was recommended and she was referred to hepatology at [facility] and was evaluated by Dr. Ortega.   3/3/25: She had a CT-guided liver biopsy of the 2.1 x 1.9 cm lesion in hepatic segment 5 and was positive for malignancy adenocarcinoma.  Immunoprofile was not specific and differential diagnosis for primary tumor sites would include pancreatic or biliary, upper GI, or TTF-1 negative lung primary.  3/13/25: CT chest no axillary, hilar, or mediastinal adenopathy.  3/19/25: Workup was performed to assess for primary site. EGD performed revealed mild chronic gastritis. Colonoscopy showed a one 3 mm polyp and one 5 mm polyp in the transverse colon significant for tubular adenoma.  Recommended repeating colonoscopy in 7 years. Mammogram obtained in January 2025  shows BIRADS 2.  4/2/25: PET/CT: significant for liver lesion (not  hypermetabolic as per notes) and without enlarged lymph nodes or other sites of disease.  4/23/25: She was evaluated by Dr. Santiago with surgical oncology.  It was felt that she may have a primary hepatic adenocarcinoma that is a cholangiocarcinoma however there is no distal biliary dilatation to this lesion which was felt not to be typical for cholangiocarcinoma.  Her CBD was markedly dilated but there is no obvious lesions in the pancreas and was felt to possibly be a benign finding seen long after cholecystectomy. She had a cholecystectomy at the age of 18 for duct stone requiring a drain for a week.  Overall it was felt that she may benefit from a partial liver resection to remove the lesion.  5/15/25: Dr. Santiago performed a exploratory laparotomy, lysis of adhesions from prior RUQ resection, segment 5/6 liver resection, creation of vascularized omental flap for coverage of liver resection defect.  There is no evidence of additional disease seen outside of the liver.  Liver was firm with macronodular cirrhosis.  The liver resection was noted to be technically challenging because of the firm liver with high portal venous pressure.  Pathology from partial hepatectomy reveals a well-differentiated adenocarcinoma (grade 1), 3 cm, small duct intrahepatic cholangiocarcinoma. Tumor focally extends beyond capsule to involve visceral peritoneal surface. Perineural invasion identified, no evidence of lymphovascular invasion, surgical margins of resection negative, background cirrhosis with ongoing steatohepatitis. pT3, pN not assigned as no nodes submitted or found. Tempus testing: MSI stable, TMB low, IDH1 positive, PDL1 negative. HER2 negative. Claudin 18: negative  Her hospital course was complicated by pneumonia and was discharged on 5/24/25.  It was recommended that patient be on Lovenox for 28 days from surgery and completed on 6/12/2025.    She continues to have abdominal pain and intermittently takes narcotics. She has  an appointment scheduled tomorrow to remove the rest of her staples.      Interim History:  Ms. Santos presents today in follow up appointment for intrahepatic cholangiocarcinoma. She was started on adjuvant Xeloda cycle one on 6/26/25. She called on 7/8/25 (finished last dose of Xeloda for that cycle on 7/9) and reported severe fatigue along with abdominal cramping and nausea.  She noted that zofran did not help her symptoms but did have some relief with promethazine.  She had a CT of the abdomen pelvis obtained by her PCP on 7/9/2025 significant for tiny amount of ascites around the liver and fatty infiltration of the liver with associated hepatomegaly. She also notes decreased appetite as well as fatigue, bone pain, and intermittent diarrhea.  She also reports that her reflux symptoms have worsened since her PPI was changed to Prilosec.            The following portions of the patient's history were reviewed and updated as appropriate: allergies, current medications, past family history, past medical history, past social history, past surgical history and problem list.      Allergies   Allergen Reactions    Contrast Dye (Echo Or Unknown Ct/Mr) Anaphylaxis    Iodinated Contrast Media Anaphylaxis    Lyrica [Pregabalin] Anaphylaxis    Amoxicillin Nausea Only    Influenza Virus Vaccine Unknown - Low Severity     SITE SWELLING AND REDNESS    Zonisamide Unknown - High Severity         Past Medical History:   Diagnosis Date    Arthritis     Asthma     Celiac disease     Diabetes     Elevated cholesterol     Gastroparesis     GERD (gastroesophageal reflux disease)     History of transfusion     Hypertension     Kidney stones     Leaky heart valve     Migraines on Botox injections every three months      PONV (postoperative nausea and vomiting)     Stomach ulcer     Stroke (cerebrum)    DVT  Lupus anticoagulant         Past Surgical History:   Procedure Laterality Date    ABDOMINAL SURGERY      APPENDECTOMY      BLADDER  SUSPENSION      sling and sling removal and mesh replaced(X3)    BRAVO PROCEDURE N/A 08/08/2018    Procedure: ESOPHAGOGASTRODUODENOSCOPY AND CORTEZ;  Surgeon: Mele Rahman MD;  Location:  [facility] OR;  Service: Gastroenterology    CHOLECYSTECTOMY      COLONOSCOPY N/A 08/08/2018    Procedure: COLONOSCOPY;  Surgeon: Mele Rahman MD;  Location:  [facility] OR;  Service: Gastroenterology    ENDOSCOPY      FRACTURE SURGERY Left     quiana  in femur     HIATAL HERNIA REPAIR N/A 09/26/2018    Procedure: HIATAL HERNIA REPAIR LAPAROSCOPIC;  Surgeon: Mele Rahman MD;  Location:  [facility] OR;  Service: General    HYSTERECTOMY - LEONARD-BSO  11 YEARS AGO    KIDNEY STONE SURGERY  01/2021    LEG SURGERY      NISSEN FUNDOPLICATION N/A 09/26/2018    Procedure: NISSEN FUNDOPLICATION LAPAROSCOPIC;  Surgeon: Mele Rahman MD;  Location: [facility] OR;  Service: General    REPLACEMENT TOTAL KNEE Left 03/29/2023             Social History     Socioeconomic History    Marital status:    Tobacco Use    Smoking status: Never     Passive exposure: Never    Smokeless tobacco: Never   Vaping Use    Vaping status: Never Used   Substance and Sexual Activity    Alcohol use: Yes     Comment: occ    Drug use: No    Sexual activity: Defer   She is  and has two children. She denies of any tobacco use or significant alcohol consumption (4 to 5 drinks yearly).        Family History   Problem Relation Age of Onset    Hypertension Mother     Heart disease Mother     Cervical cancer Mother     Diabetes Mother     Stroke Mother     Hypertension Father     Heart disease Father     Cancer unknown Paternal Aunt     Lung cancer Maternal Grandmother     Diabetes Maternal Grandmother     Hypertension Maternal Grandmother    Brother - Multiple Myeloma         Current Outpatient Medications:     aspirin 81 MG EC tablet, Take 1 tablet by mouth Daily., Disp: , Rfl:     B-D UF III MINI PEN NEEDLES 31G X 5 MM misc, Use to inject insulin four times daily, Disp:  , Rfl:     capecitabine (Xeloda) 500 MG chemo tablet, Take 5 tablets by mouth 2 (Two) Times a Day on days 1-14, then off 7 days in each 21-day cycle.  Take within 30 minutes of a meal., Disp: 140 tablet, Rfl: 7    Continuous Glucose Sensor (Dexcom G7 Sensor) misc, Apply 1 each topically Every 10 (Ten) Days., Disp: , Rfl:     diazePAM (VALIUM) 10 MG tablet, Take 1 tablet by mouth Daily., Disp: , Rfl:     famotidine (Pepcid) 20 MG tablet, Take 1 tablet by mouth 2 (Two) Times a Day., Disp: 60 tablet, Rfl: 5    Farxiga 10 MG tablet, Take 10 mg by mouth Daily., Disp: , Rfl:     fluticasone (FLONASE) 50 MCG/ACT nasal spray, Administer 2 sprays into the nostril(s) as directed by provider Daily As Needed for Rhinitis., Disp: , Rfl:     gabapentin (NEURONTIN) 600 MG tablet, Take 1 tablet by mouth 3 (Three) Times a Day., Disp: , Rfl:     glucose (DEX4) 4 GM chewable tablet, Chew 4 tablets., Disp: , Rfl:     Insulin Glargine (Lantus SoloStar) 100 UNIT/ML injection pen, Inject 36 Units under the skin into the appropriate area as directed Every Morning., Disp: , Rfl:     Insulin Lispro, 1 Unit Dial, (HumaLOG KwikPen) 100 UNIT/ML solution pen-injector, Inject under the skin three times daily prior to meals per sliding scale, Disp: , Rfl:     losartan (COZAAR) 25 MG tablet, Take 1 tablet by mouth Daily., Disp: , Rfl:     montelukast (SINGULAIR) 10 MG tablet, Take 1 tablet by mouth Every Night., Disp: , Rfl:     naloxone (Narcan) 4 MG/0.1ML nasal spray, Administer 1 spray into the nostril(s) as directed by provider As Needed for Opioid Reversal. Call 911. Don't prime. Bennett in 1 nostril for overdose. Repeat in 2-3 minutes in other nostril if no or minimal breathing/responsiveness., Disp: , Rfl:     oxyCODONE (ROXICODONE) 5 MG immediate release tablet, Take 1 tablet by mouth every night at bedtime., Disp: , Rfl:     promethazine (PHENERGAN) 25 MG tablet, Take 1 tablet by mouth Every 8 (Eight) Hours As Needed., Disp: , Rfl:      Rimegepant Sulfate (Nurtec) 75 MG tablet dispersible tablet, Take 1 tablet by mouth Daily. (Patient taking differently: Take 1 tablet by mouth Daily As Needed.), Disp: 2 tablet, Rfl: 0    rOPINIRole (REQUIP) 2 MG tablet, Take 1 tablet by mouth Every Night., Disp: , Rfl:     dronabinol (Marinol) 2.5 MG capsule, Take 1 capsule by mouth 2 (Two) Times a Day Before Meals., Disp: 60 capsule, Rfl: 0    ondansetron (ZOFRAN) 8 MG tablet, Take 1 tablet by mouth 3 (Three) Times a Day As Needed for Nausea or Vomiting. (Patient not taking: Reported on 7/17/2025), Disp: 30 tablet, Rfl: 5    triamcinolone (KENALOG) 0.1 % cream, Apply topically to affected area as needed (Patient not taking: Reported on 7/16/2025), Disp: , Rfl:     Current Facility-Administered Medications:     OnabotulinumtoxinA 200 Units, 200 Units, Intramuscular, Q3 Months, Sonia Camacho, AROLDO, 165 Units at 07/16/25 1433          Review of Systems  A comprehensive 14-point review of systems performed. Significant findings as mentioned above. All other systems reviewed and are negative.          Physical Exam  Vital Signs: These were reviewed and listed as per patient’s electronic medical chart  Vitals:    07/17/25 1313   BP: 146/87   Pulse: 81   Resp: 18   Temp: 97.9 °F (36.6 °C)   SpO2: 97%       General: Awake, alert and oriented, in no distress  HEENT: Head is atraumatic, normocephalic, extraocular movements full, no scleral icterus  Neck: supple, no jvd, lymphadenopathy or masses  Cardiovascular: regular rate and rhythm without murmurs, rubs or gallops  Pulmonary: non-labored, clear to auscultation bilaterally, no wheezing, decreased breath sounds at right base and left upper lobe  Abdomen: soft, non-tender, non-distended, normal active bowel sounds present, no organomegaly, midline abdominal staples in place, appears to be well healed  Extremities: No clubbing, cyanosis or edema  Lymph: No cervical, supraclavicular adenopathy  Neurologic: Mental  status as above, alert, awake and oriented, grossly non-focal exam  Skin: warm, dry, intact        Pain Score:  Pain Score    07/17/25 1313   PainSc: 5    PainLoc: Abdomen             PHQ-Score Total:  PHQ-9 Total Score:          IMAGING:  US abdomen complete (01/02/2025 11:37)   FINDINGS: There is a 2.5 x 2 cm lesion in the right lobe of the liver. This is lobulated and hypoechoic. This may represent a complex cyst. The gall bladder is absent.  The common duct is normal. The right kidney measures 11.5 cm in length and is normal in echogenicity without hydronephrosis. The left kidney measures 11.7 cm in length and is normal in echogenicity without hydronephrosis.  Spleen is unremarkable.  The aorta is normal in caliber. The vena cava is unremarkable.   IMPRESSION: Hypoechoic focus in the right lobe of the liver, may represent a complex cyst. Recommend infused CT to better characterize this. Otherwise, unremarkable abdominal ultrasound.     CT Abdomen Pelvis With Contrast (01/09/2025 15:25)   FINDINGS: ABDOMEN: The lung bases are clear. The heart is proper size. The liver is lobulated. There is a 22 mm enhancing lesion within the posterior segment of the right lobe of the liver, nonspecific. The gallbladder is absent. The spleen is unremarkable. No adrenal mass is present.  The pancreas is unremarkable. The kidneys are unremarkable. The aorta is   proper caliber. Pre-contrast images demonstrate no evidence of nephrolithiasis. PELVIS: The appendix is not identified. The urinary bladder is unremarkable. There is no significant free fluid or adenopathy.   IMPRESSION: Nonspecific liver lesion, neoplasm not excluded. Recommend biopsy for further evaluation. Cirrhosis.     Mammo Screening Digital Tomosynthesis Bilateral With CAD (01/17/2025 11:49)   FINDINGS:  The breast parenchyma is heterogenously dense, which may obscure small masses. Benign-appearing calcifications. There are no suspicious masses, areas of  architectural distortion or clustered microcalcifications.   BI-RADS CATEGORY: 2 , BENIGN FINDING(S).   RECOMMENDED FOLLOW-UP: 12M   FOLLOW UP SCREENING MAMMOGRAM IN ONE YEAR.     CT Chest With Contrast Diagnostic (03/13/2025 14:29)   FINDINGS: CHEST: There is no axillary adenopathy. There is no hilar or mediastinal adenopathy. Heart size is normal. There is no pericardial or pleural effusion. Limited images of the upper abdomen show prior cholecystectomy. Known hepatic lesion is poorly demonstrated on this exam. No suspicious infiltrate or nodule identified.   IMPRESSION: No acute process.     CT Abdomen Pelvis With Contrast (05/17/2025 15:40)   CT Chest With Contrast Diagnostic (05/17/2025 15:40)   FINDINGS: Chest: Lymph Nodes and Mediastinum: No lymphadenopathy by CT size criteria. Unchanged right cardiophrenic lymph nodes. No suspicious thyroid findings. Cardiovascular: The heart is normal in caliber. Thoracic great vessels are patent. Lungs and Pleura: Central airways are clear. Complete collapse of the right lower lobe. Partial atelectasis in the left lower lobe. Moderate right pleural effusion and small left pleural effusions. Musculoskeletal and Body Wall: No clearly aggressive bone lesions. Abdomen/Pelvis: Solid Abdominal Organs: Surgical changes of hepatic segment 5/6 resection with surgical edema fluid and locules of gas along the resection margin. Prior cholecystectomy. Extrahepatic biliary ductal ectasia. Unremarkable spleen, pancreas, bilateral adrenal glands and kidneys. No hydronephrosis. GI Tract/Mesentery/Peritoneum: Nondistended stomach. Nasogastric tube tip in the mid stomach. Wall thickening of the distal stomach and duodenum, likely reactive. No bowel obstruction. Pelvic Viscera: Partially distended urinary bladder with catheter in place. Prior hysterectomy. Lymph Nodes/Vasculature: Few prominent lymph nodes near the harriet hepatis, unchanged. Portacaval 13 mm short axis lymph node. The  aortoiliac vasculature is patent and normal in caliber. Splenoportal system appears patent. Free Fluid: Small volume perihepatic fluid containing locules of gas. Small volume free fluid in the pelvis. Musculoskeletal and Body Wall: Surgical changes of the midline abdomen.   IMPRESSION: Chest: Moderate right and small left pleural effusions with associated atelectasis and complete collapse of the right lower lobe. Abdomen/Pelvis: Surgical changes of hepatic segment 5/6 resection with perihepatic fluid and locules of gas along the resection margin and posterior to the liver. This may be postsurgical, related to superimposed infection or necrosis. Small volume fluid in the pelvis. No abdominal or pelvic abscess. Mild wall thickening of the distal stomach and descending duodenum, likely reactive.     CT Abdomen Pelvis Without Contrast (05/22/2025 10:50)   FINDINGS: Lower Chest: Large right and small left pleural effusions which appear similar to prior. Atelectasis of the right lower lobe. Analysis of the abdominopelvic viscera is limited by the absence of intravenous contrast material. Solid Abdominal Organs: Changes of segment V/VI liver resection with some adjacent hypodensity in the liver representing edema and fluid. Interval resolution of gas locules along the collection margin. Prior cholecystectomy. No biliary ductal dilatation. Normal noncontrast appearance of the spleen, pancreas, and adrenal glands. No hydronephrosis. GI Tract/Mesentery/Peritoneum: The small large bowel are normal by caliber. No peritoneal free air. Diffuse mesenteric edema limits evaluation for focal inflammatory change. No mesenteric fluid collection. There is right upper quadrant stranding. Pelvic Viscera: No pelvic mass.Lymph Nodes/Vasculature: There are again multiple enlarged and borderline enlarged nodes in the periportal region and also a 12 mm short axis enlarged gastrohepatic lymph node (series 3 image 92). Free Fluid: Small volume  free fluid, perihepatic along the paracolic gutters and pelvis. Musculoskeletal and Body Wall: No suspicious osseous lesions. Midline ventral surgical scar with small associated periincisional collection measuring up to 27 mm on series 3 image 163.   IMPRESSION: Changes of hepatic wedge resection with expected right upper quadrant and periduodenal stranding. No intra-abdominal or intrapelvic fluid collection. Small volume free fluid in the abdomen and pelvis.     CT Abdomen Pelvis Without Contrast (07/09/2025 15:26)   FINDINGS:Lung bases:  Unremarkable as visualized.  No mass.  No consolidation.ABDOMEN:Liver:  Fatty infiltration of the liver with associated hepatomegaly.Gallbladder and bile ducts:  Cholecystectomy clips.  No ductal dilation.  Pancreas:  Unremarkable as visualized.  No ductal dilation.  Spleen:  Unremarkable as visualized.  No splenomegaly.  Adrenals:  Unremarkable as visualized.  No mass.  Kidneys and ureters:  Unremarkable as visualized.  No obstructing stones.  No hydronephrosis.  Stomach and bowel:  Unremarkable as visualized.  No obstruction.  No  mucosal thickening. PELVIS:  Appendix:  No findings to suggest acute appendicitis.  Bladder:  Unremarkable as visualized.  No stones.  Reproductive:  Unremarkable as visualized.ABDOMEN and PELVIS: Intraperitoneal space:  Tiny amount of ascites around the liver.  No free air.  Bones/joints:  No acute fracture.  No dislocation.  Soft tissues:  Unremarkable as visualized.  Vasculature:  Unremarkable as visualized.  No abdominal aortic aneurysm.  Lymph nodes:  Unremarkable as visualized.  No enlarged lymph nodes.  IMPRESSION:1.  Tiny amount of ascites around the liver.2.  Fatty infiltration of the liver with associated hepatomegaly.        LABS/STUDIES:  Office Visit on 06/18/2025   Component Date Value    Glucose 06/18/2025 190 (H)     BUN 06/18/2025 10.4     Creatinine 06/18/2025 0.56 (L)     Sodium 06/18/2025 140     Potassium 06/18/2025 3.6      Chloride 06/18/2025 106     CO2 06/18/2025 24.1     Calcium 06/18/2025 8.8     Total Protein 06/18/2025 6.9     Albumin 06/18/2025 3.7     ALT (SGPT) 06/18/2025 22     AST (SGOT) 06/18/2025 70 (H)     Alkaline Phosphatase 06/18/2025 145 (H)     Total Bilirubin 06/18/2025 0.3     Globulin 06/18/2025 3.2     A/G Ratio 06/18/2025 1.2     BUN/Creatinine Ratio 06/18/2025 18.6     Anion Gap 06/18/2025 9.9     eGFR 06/18/2025 104.0     WBC 06/18/2025 8.17     RBC 06/18/2025 4.12     Hemoglobin 06/18/2025 11.7 (L)     Hematocrit 06/18/2025 38.2     MCV 06/18/2025 92.7     MCH 06/18/2025 28.4     MCHC 06/18/2025 30.6 (L)     RDW 06/18/2025 13.4     RDW-SD 06/18/2025 45.5     MPV 06/18/2025 10.3     Platelets 06/18/2025 176     Neutrophil % 06/18/2025 53.2     Lymphocyte % 06/18/2025 34.5     Monocyte % 06/18/2025 7.5     Eosinophil % 06/18/2025 3.7     Basophil % 06/18/2025 0.7     Immature Grans % 06/18/2025 0.4     Neutrophils, Absolute 06/18/2025 4.35     Lymphocytes, Absolute 06/18/2025 2.82     Monocytes, Absolute 06/18/2025 0.61     Eosinophils, Absolute 06/18/2025 0.30     Basophils, Absolute 06/18/2025 0.06     Immature Grans, Absolute 06/18/2025 0.03     nRBC 06/18/2025 0.0    Office Visit on 06/05/2025   Component Date Value    Glucose 06/05/2025 189 (H)     BUN 06/05/2025 8.5     Creatinine 06/05/2025 0.55 (L)     Sodium 06/05/2025 137     Potassium 06/05/2025 3.9     Chloride 06/05/2025 102     CO2 06/05/2025 21.9 (L)     Calcium 06/05/2025 9.3     Total Protein 06/05/2025 8.4     Albumin 06/05/2025 4.0     ALT (SGPT) 06/05/2025 24     AST (SGOT) 06/05/2025 119 (H)     Alkaline Phosphatase 06/05/2025 182 (H)     Total Bilirubin 06/05/2025 0.4     Globulin 06/05/2025 4.4     A/G Ratio 06/05/2025 0.9     BUN/Creatinine Ratio 06/05/2025 15.5     Anion Gap 06/05/2025 13.1     eGFR 06/05/2025 104.4     ALPHA-FETOPROTEIN 06/05/2025 2.33     CEA 06/05/2025 0.94     Vitamin B-12 06/05/2025 639     Dilute Prothrombin  Time(* 06/05/2025 33.6     dPT Confirm Ratio 06/05/2025 1.04     Thrombin Time 06/05/2025 20.6     PTT Lupus Anticoagulant 06/05/2025 37.8     Dilute Viper Venom Time 06/05/2025 37.0     Lupus Anticoagulant Refl* 06/05/2025 Comment:     Beta-2 Glyco 1 IgG 06/05/2025 <9     Beta-2 Glyco 1 IgA 06/05/2025 <9     Beta-2 Glyco 1 IgM 06/05/2025 <9     Anticardiolipin IgG 06/05/2025 10     Anticardiolipin IgM 06/05/2025 <9     Folate, Hemolysate 06/05/2025 448.0     Hematocrit 06/05/2025 42.2     RBC Folate 06/05/2025 1062     WBC 06/05/2025 11.55 (H)     RBC 06/05/2025 4.60     Hemoglobin 06/05/2025 12.9     Hematocrit 06/05/2025 41.4     MCV 06/05/2025 90.0     MCH 06/05/2025 28.0     MCHC 06/05/2025 31.2 (L)     RDW 06/05/2025 13.2     RDW-SD 06/05/2025 43.1     MPV 06/05/2025 11.0     Platelets 06/05/2025 251     Neutrophil % 06/05/2025 59.1     Lymphocyte % 06/05/2025 30.9     Monocyte % 06/05/2025 6.3     Eosinophil % 06/05/2025 2.3     Basophil % 06/05/2025 0.8     Immature Grans % 06/05/2025 0.6 (H)     Neutrophils, Absolute 06/05/2025 6.83     Lymphocytes, Absolute 06/05/2025 3.57 (H)     Monocytes, Absolute 06/05/2025 0.73     Eosinophils, Absolute 06/05/2025 0.26     Basophils, Absolute 06/05/2025 0.09     Immature Grans, Absolute 06/05/2025 0.07 (H)     nRBC 06/05/2025 0.0          PATHOLOGY:   3/3/25 CT GUIDED LIVER BIOPSY AT              3/19/25 COLONOSCOPY AT         5/15/25 PARTIAL HEPATECTOMY AT                 Assessment & Plan   Esme Santos is a very pleasant 60 y.o.  female who presents in follow up appointment for further management and evaluation of intrahepatic cholangiocarcinoma.    Intrahepatic cholangiocarcinoma (pT3, NX, M0)  - Diagnosed via CT guided biopsy with adenocarcinoma in March 2025 and is s/p segment 5/6 liver resection with Dr. Santiago on 5/15/25. Pathology was significant for a small duct intrahepatic pT3 cholangiocarcinoma, 3 cm, with negative margins. AFP prior to  surgery was normal at <2.3. Tempus testing: MSI stable, TMB low, IDH1 positive, PDL1 negative. HER2 negative. Claudin 18: negative.  - She was evaluated by Dr. Santiago post surgical resection and recommended adjuvant chemotherapy and opted for adjuvant treatment locally.   - I spent some time reviewing her pathology and imaging results as well as pathology, staging, and prognosis as well as recommended adjuvant treatment.   - I would recommend adjuvant capecitabine 1,250 mg/m2 po twice daily on days 1-14 every 21 days for six months as adjuvant chemotherapy has been associated with overall survival benefit based on the BILCAP study. Patient should be routinely evaluated for toxicities to assess whether she will require dose adjustment. If patient develops progression or metastatic disease would recommend comprehensive molecular testing.    - I have discussed the above recommendations at length with MsJohn Santos and she would like to proceed with adjuvant treatment after we discussed the possible risks, benefits, and toxicities of treatment.  - On 6/26/25 she was started on Xeloda 500mg tablets (5 tablets BID on days 1-14, then off 7 days in each 21-day cycle). Recommended Protonix be changed to Pepcid due to drug interaction between Protonix and Xeloda and she is now on Pepcid which has not been controlling her symptoms and further discussed below.   - She has been feeling better on her week off but remains fatigued. She would like to proceed with same dose at present and will notify our clinic should any issues arise or symptoms worse. I will re-evaluate in three weeks prior to next dose.     Surveillance  - Would recommend CT vs MRI abdomen and pelvis as well as CT chest with IV contrast every 3 to 6 months for two years, then every 6 to 12 months for up to 5 years. She is allergic to IV contrast and will require premedications for imaging.   - Patient recently had a CT of the abdomen pelvis however this does done  without contrast therefore we will obtain CT of the chest and MRI of the abdomen and pelvis in September.    Nausea  GERD  - Patient reports worsening GERD symptoms since Protonix was changed to Pepcid.  Unfortunately any PPI can cause decreased efficacy of Xeloda.   - She reports that Zofran has not been controlling her nausea and noted that Phenergan helping her symptoms.  She was prescribed Compazine to see if this could also help her symptoms and was educated to not take Phenergan while taking Compazine.  However she has been taking both throughout the day.  I again educated her on possible side effects when taking both and she would prefer Phenergan as this better controls her nausea.    Hypokalemia and hypomagnesemia  - Started potassium and magnesium replacement.     Diarrhea  - She reports that this has been a chronic issue prior to starting Xeloda and notes a history significant for gastroparesis.  Will obtain stool studies today and she was also advised on Imodium use.  If this does not control her diarrhea we will then prescribe Lomotil.    Shortness of breath  - She was previously following with pulmonary and is now on supplemental oxygen.  Referral was placed to pulmonology and she will see them on 8/7. She has a prior history significant for asthma.   - Breathing is much better.  Lung exam unremarkable.  She is down to 2 L nasal cannula.  She only finished 5 days of her Augmentin due to diarrhea, and diarrhea has now resolved.     Lupus anticoagulant  - Ms. Santos reports being told in the past that she has lupus anticoagulant which makes her at risk for clots.  - She has no family history significant for recurrent thrombosis or recurrent miscarriages.  She was diagnosed with a right upper extremity clot 2 days after being discharged from the hospital in her 40s and was on anticoagulation for a short period of time.  Several years after that she developed a blood clot in her lungs and at that time was  started on Coumadin and remained on the medication for several years before stopping the medication with her hematologist due to excess bleeding.  Since that time patient has had knee surgery as well as fracture repair and was reportedly at risk received a postop Lovenox for 1 week given her history of lupus anticoagulant.  - She was evaluated by hematology at [facility] for possible antiphospholipid syndrome concern.  It was felt that there was no convincing evidence that patient currently has a hypercoagulable state and recommended Lovenox at prophylactic dose while inpatient and has continued for 1 month postsurgery.  - Antiphospholipid studies obtained shows normal beta-2 glycoprotein and anticardiolipin antibodies and negative for lupus anticoagulant.    Folate deficiency history  - She is currently not on folic acid and levels were obtained at her last visit along with B12 level which were normal.    ACO / DEANA/Other  Quality measures  -  Esme Santos  reports that she has never smoked. She has never been exposed to tobacco smoke. She has never used smokeless tobacco.   -  Esme Santos unknown if she received 2024 flu vaccine.  -  Esme Santos reports a pain score of 5.  Given her pain assessment as noted, treatment options were discussed and the following options were decided upon as a follow-up plan to address the patient's pain: continuation of current treatment plan for pain.  -  Current outpatient and discharge medications have been reconciled for the patient.  Reviewed by: Monse Lyn MD      I will have the patient return in follow up appointment in three weeks prior to third cycle to reassess toxicity with labs (CBC, CMP, iron panel, magnesium, phosphorus, TSH). She understands that should she have any questions or concerns prior to her appointment she should give us a call at any time and I would be happy to see her sooner. It was a pleasure to see this patient in clinic today, thank you for  allowing me to participate in the care of this patient.    I spent 60 minutes caring for patient on this date of service. This time includes time spent by me in the following activities: preparing for the visit, reviewing tests, obtaining and/or reviewing a separately obtained history, performing a medically appropriate examination and/or evaluation, counseling and educating the patient/family/caregiver, ordering medications, tests, or procedures, documenting information in the medical record, independently interpreting results and communicating that information with the patient/family/caregiver, and care coordination as well as answering any questions.        Monse Lyn MD   07/17/25   14:14 EDT

## 2025-07-16 ENCOUNTER — DISEASE STATE MANAGEMENT VISIT (OUTPATIENT)
Dept: PHARMACY | Facility: HOSPITAL | Age: 60
End: 2025-07-16
Payer: MEDICAID

## 2025-07-16 VITALS
OXYGEN SATURATION: 97 % | DIASTOLIC BLOOD PRESSURE: 85 MMHG | BODY MASS INDEX: 28.91 KG/M2 | HEART RATE: 80 BPM | HEIGHT: 67 IN | WEIGHT: 184.2 LBS | TEMPERATURE: 98.6 F | SYSTOLIC BLOOD PRESSURE: 151 MMHG

## 2025-07-16 DIAGNOSIS — G43.719 INTRACTABLE CHRONIC MIGRAINE WITHOUT AURA AND WITHOUT STATUS MIGRAINOSUS: Primary | ICD-10-CM

## 2025-07-16 PROCEDURE — 64615 CHEMODENERV MUSC MIGRAINE: CPT | Performed by: NURSE PRACTITIONER

## 2025-07-16 PROCEDURE — 25010000002 ONABOTULINUMTOXINA 200 UNITS RECONSTITUTED SOLUTION: Performed by: NURSE PRACTITIONER

## 2025-07-16 RX ADMIN — ONABOTULINUMTOXINA 165 UNITS: 200 INJECTION, POWDER, LYOPHILIZED, FOR SOLUTION INTRADERMAL; INTRAMUSCULAR at 14:33

## 2025-07-16 NOTE — PROGRESS NOTES
Botox Procedure Note       Patient Name: Esme Santos  : 1965   MRN: [id number]     Chief Complaint:  Here for Botox injections and the Botox is facility supplied (will be billed by the hospital).      History of Present Illness: Esme Santos is a 60 y.o. female who is here today for Botox injections for migraines and she has had 97% improvement in her migraines with Botox injections.     We have discussed risk and benefits of this Botox procedure and common side effects including headache, neck pain, neck stiffness or weakness, ptosis, flu-like symptoms as well as more serious possible adverse effects including possible dysphagia, respiratory distress or even death (death has only been reported once with adults for Botox for migraines in another state when mixed with lidocaine solution which we do not use lidocaine solution in our practice for mixing Botox). The patient verbally understands and accepts risks and agrees with moving forward with Botox injections for chronic migraine prevention.    Verbal and written consent has been obtained from the patient prior to beginning treatment injections.     Subjective      Review of Systems:   Review of Systems    The following portions of the patient's history were reviewed an updated as appropriate: past family history, past medical history, past social history, past surgical history.     Medications:     Current Outpatient Medications:     albuterol (PROVENTIL) (2.5 MG/3ML) 0.083% nebulizer solution, Take 2.5 mg by nebulization Every 6 (Six) Hours., Disp: , Rfl:     aspirin 81 MG EC tablet, Take 1 tablet by mouth Daily., Disp: , Rfl:     B-D UF III MINI PEN NEEDLES 31G X 5 MM misc, Use to inject insulin four times daily, Disp: , Rfl:     capecitabine (Xeloda) 500 MG chemo tablet, Take 5 tablets by mouth 2 (Two) Times a Day on days 1-14, then off 7 days in each 21-day cycle.  Take within 30 minutes of a meal., Disp: 140 tablet, Rfl: 7    Continuous  Glucose Sensor (Dexcom G7 Sensor) misc, Apply 1 each topically Every 10 (Ten) Days., Disp: , Rfl:     diazePAM (VALIUM) 10 MG tablet, Take 1 tablet by mouth Daily., Disp: , Rfl:     famotidine (Pepcid) 20 MG tablet, Take 1 tablet by mouth 2 (Two) Times a Day., Disp: 60 tablet, Rfl: 5    Farxiga 10 MG tablet, Take 10 mg by mouth Daily., Disp: , Rfl:     fluticasone (FLONASE) 50 MCG/ACT nasal spray, Administer 2 sprays into the nostril(s) as directed by provider Daily As Needed for Rhinitis., Disp: , Rfl:     gabapentin (NEURONTIN) 600 MG tablet, Take 1 tablet by mouth 3 (Three) Times a Day., Disp: , Rfl:     glucose (DEX4) 4 GM chewable tablet, Chew 4 tablets., Disp: , Rfl:     Insulin Glargine (Lantus SoloStar) 100 UNIT/ML injection pen, Inject 36 Units under the skin into the appropriate area as directed Every Morning., Disp: , Rfl:     Insulin Lispro, 1 Unit Dial, (HumaLOG KwikPen) 100 UNIT/ML solution pen-injector, Inject under the skin three times daily prior to meals per sliding scale, Disp: , Rfl:     Linzess 72 MCG capsule capsule, Take 1 capsule by mouth Every Other Day., Disp: , Rfl:     losartan (COZAAR) 25 MG tablet, Take 1 tablet by mouth Daily., Disp: , Rfl:     methocarbamol (ROBAXIN) 500 MG tablet, Take 1 tablet by mouth Daily., Disp: , Rfl:     montelukast (SINGULAIR) 10 MG tablet, Take 1 tablet by mouth Every Night., Disp: , Rfl:     naloxone (Narcan) 4 MG/0.1ML nasal spray, Administer 1 spray into the nostril(s) as directed by provider As Needed for Opioid Reversal. Call 911. Don't prime. [place] in 1 nostril for overdose. Repeat in 2-3 minutes in other nostril if no or minimal breathing/responsiveness., Disp: , Rfl:     OnabotulinumtoxinA 200 units reconstituted solution, Inject into appropriate area as directed by provider every 3 months, Disp: , Rfl:     ondansetron (ZOFRAN) 8 MG tablet, Take 1 tablet by mouth 3 (Three) Times a Day As Needed for Nausea or Vomiting., Disp: 30 tablet, Rfl: 5     "oxyCODONE (ROXICODONE) 5 MG immediate release tablet, Take 1 tablet by mouth every night at bedtime., Disp: , Rfl:     prochlorperazine (COMPAZINE) 10 MG tablet, Take 1 tablet by mouth Every 6 (Six) Hours As Needed for Nausea or Vomiting., Disp: 120 tablet, Rfl: 3    promethazine (PHENERGAN) 25 MG tablet, Take 1 tablet by mouth Every 8 (Eight) Hours As Needed., Disp: , Rfl:     Rimegepant Sulfate (Nurtec) 75 MG tablet dispersible tablet, Take 1 tablet by mouth Daily. (Patient taking differently: Take 1 tablet by mouth Daily As Needed.), Disp: 2 tablet, Rfl: 0    rOPINIRole (REQUIP) 2 MG tablet, Take 1 tablet by mouth Every Night., Disp: , Rfl:     sennosides-docusate (Senexon-S) 8.6-50 MG per tablet, Take 1 tablet by mouth Daily. (Patient not taking: Reported on 7/16/2025), Disp: , Rfl:     triamcinolone (KENALOG) 0.1 % cream, Apply topically to affected area as needed (Patient not taking: Reported on 7/16/2025), Disp: , Rfl:     Current Facility-Administered Medications:     OnabotulinumtoxinA 200 Units, 200 Units, Intramuscular, Q3 Months, Sonia Camacho APRN, 165 Units at 07/16/25 1433    Allergies:   Allergies   Allergen Reactions    Contrast Dye (Echo Or Unknown Ct/Mr) Anaphylaxis    Iodinated Contrast Media Anaphylaxis    Lyrica [Pregabalin] Anaphylaxis    Amoxicillin Nausea Only    Influenza Virus Vaccine Unknown - Low Severity     SITE SWELLING AND REDNESS    Zonisamide Unknown - High Severity       Objective     Physical Exam:  Vital Signs:   Vitals:    07/16/25 1409   BP: 151/85   Pulse: 80   Temp: 98.6 °F (37 °C)   SpO2: 97%   Weight: 83.6 kg (184 lb 3.2 oz)   Height: 170.2 cm (67\")   PainSc: 5    PainLoc: Head     Body mass index is 28.85 kg/m².     Physical Exam      BOTOX PROCEDURE:     Date of Last Injection:   Response: good  Side Effects: None  : Allergan  Lot #: [id number]  Expiration Date: 10/2027  NDC: 0722129408    200 unit vial Botox was re-constituted with 4 mL 0.9 NS to 5 " unit/0.1 mL using standard techniques.  10 units were given at the  muscle in 2 divided sites  5 units were given at the Procerus muscle  20 units were given at the Frontalis muscle in 4 divided sites  40 units were given at the Temporalis muscle in 8 divided sites  30 units were given at the Occipitalis muscle in 6 divided sites  20 units were given at the Cervical paraspinal muscle in 4 divided sites  30 units were given at the Trapezius muscle in 6 divided sites  10 units were given at the Masseter muscle in 2 divided sites.   For a total of 165 units divided between 33 sites and 35 units of wastage    Patient tolerated procedure well with no immediate complications.     Assessment / Plan      Assessment/Plan:   Diagnoses and all orders for this visit:    1. Intractable chronic migraine without aura and without status migrainosus (Primary)  -     OnabotulinumtoxinA 200 Units         Follow Up:   It has been determined that Esme Santos has chronic migraine headaches. We will proceed with a 12 week regimen of 200 units of Botox injections, to treat the patient's chronic migraines.      Return in about 3 months (around 10/16/2025) for Botox.      AROLDO Bustos, FNP-C  [facility]

## 2025-07-17 ENCOUNTER — OFFICE VISIT (OUTPATIENT)
Age: 60
End: 2025-07-17
Payer: MEDICAID

## 2025-07-17 ENCOUNTER — HOSPITAL ENCOUNTER (OUTPATIENT)
Facility: HOSPITAL | Age: 60
Discharge: HOME | End: 2025-07-17
Admitting: INTERNAL MEDICINE
Payer: MEDICAID

## 2025-07-17 VITALS
WEIGHT: 182.5 LBS | DIASTOLIC BLOOD PRESSURE: 87 MMHG | TEMPERATURE: 97.9 F | HEIGHT: 67 IN | OXYGEN SATURATION: 97 % | SYSTOLIC BLOOD PRESSURE: 146 MMHG | RESPIRATION RATE: 18 BRPM | BODY MASS INDEX: 28.64 KG/M2 | HEART RATE: 81 BPM

## 2025-07-17 DIAGNOSIS — R19.7 DIARRHEA, UNSPECIFIED TYPE: ICD-10-CM

## 2025-07-17 DIAGNOSIS — E83.42 HYPOMAGNESEMIA: ICD-10-CM

## 2025-07-17 DIAGNOSIS — E53.8 FOLATE DEFICIENCY: ICD-10-CM

## 2025-07-17 DIAGNOSIS — R63.0 APPETITE LOSS: ICD-10-CM

## 2025-07-17 DIAGNOSIS — R11.0 NAUSEA: ICD-10-CM

## 2025-07-17 DIAGNOSIS — E87.6 HYPOKALEMIA: ICD-10-CM

## 2025-07-17 DIAGNOSIS — C22.1 INTRAHEPATIC CHOLANGIOCARCINOMA: Primary | ICD-10-CM

## 2025-07-17 LAB
ALBUMIN SERPL-MCNC: 3.9 G/DL (ref 3.5–5.2)
ALBUMIN/GLOB SERPL: 1.1 G/DL
ALP SERPL-CCNC: 154 U/L (ref 39–117)
ALT SERPL W P-5'-P-CCNC: 19 U/L (ref 1–33)
ANION GAP SERPL CALCULATED.3IONS-SCNC: 12.8 MMOL/L (ref 5–15)
AST SERPL-CCNC: 63 U/L (ref 1–32)
BASOPHILS # BLD AUTO: 0.05 10*3/MM3 (ref 0–0.2)
BASOPHILS NFR BLD AUTO: 0.4 % (ref 0–1.5)
BILIRUB SERPL-MCNC: 0.7 MG/DL (ref 0–1.2)
BUN SERPL-MCNC: 7 MG/DL (ref 8–23)
BUN/CREAT SERPL: 10.9 (ref 7–25)
CALCIUM SPEC-SCNC: 9.2 MG/DL (ref 8.6–10.5)
CHLORIDE SERPL-SCNC: 100 MMOL/L (ref 98–107)
CO2 SERPL-SCNC: 25.2 MMOL/L (ref 22–29)
CREAT SERPL-MCNC: 0.64 MG/DL (ref 0.57–1)
DEPRECATED RDW RBC AUTO: 42.3 FL (ref 37–54)
EGFRCR SERPLBLD CKD-EPI 2021: 100.7 ML/MIN/1.73
EOSINOPHIL # BLD AUTO: 0.21 10*3/MM3 (ref 0–0.4)
EOSINOPHIL NFR BLD AUTO: 1.8 % (ref 0.3–6.2)
ERYTHROCYTE [DISTWIDTH] IN BLOOD BY AUTOMATED COUNT: 14.4 % (ref 12.3–15.4)
GLOBULIN UR ELPH-MCNC: 3.4 GM/DL
GLUCOSE SERPL-MCNC: 179 MG/DL (ref 65–99)
HCT VFR BLD AUTO: 37.4 % (ref 34–46.6)
HGB BLD-MCNC: 12.1 G/DL (ref 12–15.9)
IMM GRANULOCYTES # BLD AUTO: 0.07 10*3/MM3 (ref 0–0.05)
IMM GRANULOCYTES NFR BLD AUTO: 0.6 % (ref 0–0.5)
LYMPHOCYTES # BLD AUTO: 3.43 10*3/MM3 (ref 0.7–3.1)
LYMPHOCYTES NFR BLD AUTO: 29.1 % (ref 19.6–45.3)
MAGNESIUM SERPL-MCNC: 1.4 MG/DL (ref 1.6–2.4)
MCH RBC QN AUTO: 28.3 PG (ref 26.6–33)
MCHC RBC AUTO-ENTMCNC: 32.4 G/DL (ref 31.5–35.7)
MCV RBC AUTO: 87.4 FL (ref 79–97)
MONOCYTES # BLD AUTO: 1.03 10*3/MM3 (ref 0.1–0.9)
MONOCYTES NFR BLD AUTO: 8.7 % (ref 5–12)
NEUTROPHILS NFR BLD AUTO: 59.4 % (ref 42.7–76)
NEUTROPHILS NFR BLD AUTO: 7 10*3/MM3 (ref 1.7–7)
NRBC BLD AUTO-RTO: 0 /100 WBC (ref 0–0.2)
PHOSPHATE SERPL-MCNC: 2.6 MG/DL (ref 2.5–4.5)
PLATELET # BLD AUTO: 203 10*3/MM3 (ref 140–450)
PMV BLD AUTO: 10.8 FL (ref 6–12)
POTASSIUM SERPL-SCNC: 3.1 MMOL/L (ref 3.5–5.2)
PROT SERPL-MCNC: 7.3 G/DL (ref 6–8.5)
RBC # BLD AUTO: 4.28 10*6/MM3 (ref 3.77–5.28)
SODIUM SERPL-SCNC: 138 MMOL/L (ref 136–145)
WBC NRBC COR # BLD AUTO: 11.79 10*3/MM3 (ref 3.4–10.8)

## 2025-07-17 PROCEDURE — 84100 ASSAY OF PHOSPHORUS: CPT | Performed by: INTERNAL MEDICINE

## 2025-07-17 PROCEDURE — 81232 DPYD GENE COMMON VARIANTS: CPT | Performed by: INTERNAL MEDICINE

## 2025-07-17 PROCEDURE — 80053 COMPREHEN METABOLIC PANEL: CPT | Performed by: INTERNAL MEDICINE

## 2025-07-17 PROCEDURE — 85025 COMPLETE CBC W/AUTO DIFF WBC: CPT | Performed by: INTERNAL MEDICINE

## 2025-07-17 PROCEDURE — 36415 COLL VENOUS BLD VENIPUNCTURE: CPT | Performed by: INTERNAL MEDICINE

## 2025-07-17 PROCEDURE — 83735 ASSAY OF MAGNESIUM: CPT | Performed by: INTERNAL MEDICINE

## 2025-07-17 RX ORDER — DRONABINOL 2.5 MG/1
2.5 CAPSULE ORAL
Qty: 60 CAPSULE | Refills: 0 | Status: SHIPPED | OUTPATIENT
Start: 2025-07-17 | End: 2025-07-21 | Stop reason: DRUGHIGH

## 2025-07-18 RX ORDER — MAGNESIUM OXIDE 400 MG/1
400 TABLET ORAL 2 TIMES DAILY
Qty: 14 TABLET | Refills: 0 | Status: SHIPPED | OUTPATIENT
Start: 2025-07-18 | End: 2025-07-25

## 2025-07-18 RX ORDER — POTASSIUM CHLORIDE 1500 MG/1
20 TABLET, EXTENDED RELEASE ORAL 2 TIMES DAILY
Qty: 10 TABLET | Refills: 0 | Status: SHIPPED | OUTPATIENT
Start: 2025-07-18 | End: 2025-07-23

## 2025-07-21 DIAGNOSIS — C22.1 INTRAHEPATIC CHOLANGIOCARCINOMA: Primary | ICD-10-CM

## 2025-07-21 RX ORDER — DRONABINOL 5 MG/1
5 CAPSULE ORAL
Qty: 60 CAPSULE | Refills: 0 | Status: SHIPPED | OUTPATIENT
Start: 2025-07-21

## 2025-07-21 RX ORDER — DRONABINOL 5 MG/1
5 CAPSULE ORAL
Qty: 60 CAPSULE | Refills: 0 | Status: SHIPPED | OUTPATIENT
Start: 2025-07-21 | End: 2025-07-21

## 2025-07-21 NOTE — TELEPHONE ENCOUNTER
Per Dr. Lyn the pt can take TUMS prn for GERD symptoms, however this cannot be taken within 2 hours of Xeloda.  Called pt on 7/18/25 and went over this information.  Also recommended that the pt not take the magnesium was was recently prescribed within 2 hours of the Xeloda as well.    ON 7/18/25 the patient reports having fatigue after restarting the chemo, but she did have issues with her nightly oxygen machine and didn't get as much sleep the night before.  She denies any diarrhea so far with this round but has only eaten two pieces of toast and a protein shake today.   Notified provider Dr. Lyn of this information.    Nile Snyder [facility]

## 2025-07-21 NOTE — TELEPHONE ENCOUNTER
"Dr. Lyn,    The patient called me just now and stated that she is heaving so much that she is spitting up blood now (pt states that it's \"not much blood\" when asked about the amount of blood).  I wanted to let you know in case she needs to be seen for an acute visit or go to ER.    She also stated that she can't find a pharmacy locally that can get the dronabinol that was prescribed by you.  I checked with [facility] and it is on backorder for them.     Thank you,  Nile Snyder, [facility]  07/21/25  13:06 EDT    "

## 2025-07-21 NOTE — TELEPHONE ENCOUNTER
Re-routing to Dr. Hernandez since to [place] is out of office today.    Nile Snyder, [facility]  07/21/25  14:36 EDT

## 2025-07-21 NOTE — TELEPHONE ENCOUNTER
"Patient reports she \"heaves so hard that her throat becomes irritated and specs of blood will come up, but it is not a lot at all.\" RN offered an acute visit, but she politely declined. She said this was normal for her \"even before I got my surgery.\" She said she is mostly concerned about getting her Marinol to see if this will help her. Patient reports taking Zofran with little relief, but taking phenergan that is more alleviating. When RN asked if patient is tolerating oral fluids and food, she reports she does not eat much, but she has been supplementing with 4 ensures per day and water. RN voiced understanding and encouraged her to promote oral hydration with water and electrolytes to prevent dehydration. Patient voiced understanding.     RN to check on Marinol prescription.     After calling multiple pharmacies, RN was informed that Marinol 2.5 mg on back order at  [facility], [facility], [facility], [facility], [facility], [facility], [facility], [facility], [facility], [facility], [facility], and [facility].     RN discussed with MD, on call, who was agreeable to prescribe Marinol 5 mg as it is in stock at  [facility]. RN relayed this to patient and informed her that it may be delivered to her house in the next day or so. RN educated her about medication, to never take more than prescribed, and to please call us if she has any questions or concerns in the meantime and we will get her in for an acute visit. Patient voiced understanding.     "

## 2025-07-22 ENCOUNTER — SPECIALTY PHARMACY (OUTPATIENT)
Dept: CARDIOLOGY | Facility: HOSPITAL | Age: 60
End: 2025-07-22
Payer: MEDICAID

## 2025-07-22 NOTE — PROGRESS NOTES
Specialty Pharmacy Patient Management Program  Refill Outreach      Esme was contacted today regarding refills of their medication(s).    Refill Questions      Flowsheet Row Most Recent Value   Changes to allergies? No   Changes to medications? Yes  [Magnesium, potassium, and dronabinol recently added by oncology]   New conditions or infections since last clinic visit No   Unplanned office visit, urgent care, ED, or hospital admission in the last 4 weeks  No   How does patient/caregiver feel medication is working? Good   Financial problems or insurance changes  No   Since the previous refill, were any specialty medication doses or scheduled injections missed or delayed?  No   Does this patient require a clinical escalation to a pharmacist? No          Delivery Questions      Flowsheet Row Most Recent Value   Delivery method UPS   Delivery address verified with patient/caregiver? Yes   Delivery address Home   Number of medications in delivery 2   Medication(s) being filled and delivered Famotidine (PEPCID), droNABinol (MARINOL)   Copay verified? Yes   Copay amount $0   Copay form of payment No copayment ($0)   Delivery Date Selection 07/23/25   Signature Required Yes   Do you consent to receive electronic handouts?  No            Follow-Up: 28 days.  PA for dronabinol submitted and approved.  Educated pt on dronabinol indication/use and possible side effects with the medication, including but not limited to euphoria, drowsiness, hallucination, dizziness, etc .  Recommended that pt not drive after taking this medication and to let us know if she experiences any adverse effects with the medication.    Nile Snyder RPH  7/22/2025  10:48 EDT

## 2025-07-24 LAB
CLINICAL INFO: NORMAL
DPYD GENE MUT ANL BLD/T: NORMAL
DPYD GENE PROD MET ACT IMP BLD/T-IMP: NORMAL
IMP & REVIEW OF LAB RESULTS: NORMAL
MEDICAL DIRECTOR REVIEW: NORMAL

## 2025-07-28 ENCOUNTER — SPECIALTY PHARMACY (OUTPATIENT)
Dept: CARDIOLOGY | Facility: HOSPITAL | Age: 60
End: 2025-07-28
Payer: MEDICAID

## 2025-07-28 ENCOUNTER — TELEPHONE (OUTPATIENT)
Dept: ONCOLOGY | Facility: CLINIC | Age: 60
End: 2025-07-28
Payer: MEDICAID

## 2025-07-28 NOTE — PROGRESS NOTES
Specialty Pharmacy Patient Management Program  Refill Outreach     Esme was contacted today regarding refills of their medication(s).    Refill Questions      Flowsheet Row Most Recent Value   Changes to allergies? No   Changes to medications? No   New conditions or infections since last clinic visit No   Unplanned office visit, urgent care, ED, or hospital admission in the last 4 weeks  No   How does patient/caregiver feel medication is working? Good   Financial problems or insurance changes  No   Since the previous refill, were any specialty medication doses or scheduled injections missed or delayed?  No   Does this patient require a clinical escalation to a pharmacist? No            Delivery Questions      Flowsheet Row Most Recent Value   Delivery method UPS   Delivery address verified with patient/caregiver? Yes   Delivery address Home   Other address preferred N/A   Number of medications in delivery 1   Medication(s) being filled and delivered Capecitabine (XELODA)   Doses left of specialty medications 0   Copay verified? Yes   Copay amount $0.00   Copay form of payment No copayment ($0)   Delivery Date Selection 07/30/25   Signature Required Yes   Do you consent to receive electronic handouts?  No                 Follow-up: 14 day(s)     Brunilda Rosenthal, Pharmacy Technician  7/28/2025  14:07 EDT

## 2025-07-28 NOTE — TELEPHONE ENCOUNTER
RN called to check on patient, (see prior phone call on 7/21) she was started on Marinol for nausea, patient stated she felt somewhat better since taking it.  RN offered patient to come to clinic for IV fluids and antiemetics, however patient declined.      Esme verbalized understanding in regard to all topics discussed and had no questions/concerns at this time.

## 2025-07-29 ENCOUNTER — TELEPHONE (OUTPATIENT)
Dept: ONCOLOGY | Facility: CLINIC | Age: 60
End: 2025-07-29
Payer: MEDICAID

## 2025-07-29 NOTE — TELEPHONE ENCOUNTER
Esme called and stated that the soles of her feet are hurting really bad and she can't hardly walk on them. She wanted to know if that was normal or what she needed to do.

## 2025-07-29 NOTE — TELEPHONE ENCOUNTER
RN discussed patient's symptoms with Dr. Lyn who requested that patient be evaluated at an acute visit; RN relayed this information to patient who was agreeable to come in tomorrow for a acute visit with NP at 2:30.

## 2025-07-30 ENCOUNTER — INFUSION (OUTPATIENT)
Dept: ONCOLOGY | Facility: HOSPITAL | Age: 60
End: 2025-07-30
Payer: MEDICAID

## 2025-07-30 ENCOUNTER — LAB (OUTPATIENT)
Dept: LAB | Facility: HOSPITAL | Age: 60
End: 2025-07-30
Payer: MEDICAID

## 2025-07-30 ENCOUNTER — OFFICE VISIT (OUTPATIENT)
Dept: ONCOLOGY | Facility: CLINIC | Age: 60
End: 2025-07-30
Payer: MEDICAID

## 2025-07-30 VITALS
BODY MASS INDEX: 28.12 KG/M2 | HEIGHT: 67 IN | RESPIRATION RATE: 18 BRPM | OXYGEN SATURATION: 96 % | HEART RATE: 91 BPM | DIASTOLIC BLOOD PRESSURE: 76 MMHG | SYSTOLIC BLOOD PRESSURE: 134 MMHG | TEMPERATURE: 97.5 F | WEIGHT: 179.2 LBS

## 2025-07-30 VITALS
WEIGHT: 179 LBS | BODY MASS INDEX: 28.04 KG/M2 | SYSTOLIC BLOOD PRESSURE: 144 MMHG | HEART RATE: 84 BPM | TEMPERATURE: 98.2 F | DIASTOLIC BLOOD PRESSURE: 79 MMHG | RESPIRATION RATE: 20 BRPM | OXYGEN SATURATION: 97 %

## 2025-07-30 DIAGNOSIS — R20.2 PARESTHESIAS: ICD-10-CM

## 2025-07-30 DIAGNOSIS — C22.1 INTRAHEPATIC CHOLANGIOCARCINOMA: Primary | ICD-10-CM

## 2025-07-30 DIAGNOSIS — E83.42 HYPOMAGNESEMIA: ICD-10-CM

## 2025-07-30 DIAGNOSIS — R19.7 DIARRHEA, UNSPECIFIED TYPE: ICD-10-CM

## 2025-07-30 DIAGNOSIS — C22.1 INTRAHEPATIC CHOLANGIOCARCINOMA: ICD-10-CM

## 2025-07-30 DIAGNOSIS — E83.42 HYPOMAGNESEMIA: Primary | ICD-10-CM

## 2025-07-30 DIAGNOSIS — R11.0 NAUSEA: ICD-10-CM

## 2025-07-30 LAB
ADV 40+41 DNA STL QL NAA+NON-PROBE: NOT DETECTED
ALBUMIN SERPL-MCNC: 3.7 G/DL (ref 3.5–5.2)
ALBUMIN/GLOB SERPL: 1.1 G/DL
ALP SERPL-CCNC: 150 U/L (ref 39–117)
ALT SERPL W P-5'-P-CCNC: 17 U/L (ref 1–33)
ANION GAP SERPL CALCULATED.3IONS-SCNC: 12.4 MMOL/L (ref 5–15)
AST SERPL-CCNC: 77 U/L (ref 1–32)
ASTRO TYP 1-8 RNA STL QL NAA+NON-PROBE: NOT DETECTED
BASOPHILS # BLD AUTO: 0.06 10*3/MM3 (ref 0–0.2)
BASOPHILS NFR BLD AUTO: 0.5 % (ref 0–1.5)
BILIRUB SERPL-MCNC: 0.5 MG/DL (ref 0–1.2)
BUN SERPL-MCNC: 7.9 MG/DL (ref 8–23)
BUN/CREAT SERPL: 12.3 (ref 7–25)
C CAYETANENSIS DNA STL QL NAA+NON-PROBE: NOT DETECTED
C COLI+JEJ+UPSA DNA STL QL NAA+NON-PROBE: NOT DETECTED
CALCIUM SPEC-SCNC: 8.4 MG/DL (ref 8.6–10.5)
CHLORIDE SERPL-SCNC: 103 MMOL/L (ref 98–107)
CLUMPED PLATELETS: PRESENT
CO2 SERPL-SCNC: 20.6 MMOL/L (ref 22–29)
CREAT SERPL-MCNC: 0.64 MG/DL (ref 0.57–1)
CRYPTOSP DNA STL QL NAA+NON-PROBE: NOT DETECTED
DEPRECATED RDW RBC AUTO: 46.9 FL (ref 37–54)
E HISTOLYT DNA STL QL NAA+NON-PROBE: NOT DETECTED
EAEC PAA PLAS AGGR+AATA ST NAA+NON-PRB: NOT DETECTED
EC STX1+STX2 GENES STL QL NAA+NON-PROBE: NOT DETECTED
EGFRCR SERPLBLD CKD-EPI 2021: 100.7 ML/MIN/1.73
EOSINOPHIL # BLD AUTO: 0.13 10*3/MM3 (ref 0–0.4)
EOSINOPHIL NFR BLD AUTO: 1 % (ref 0.3–6.2)
EPEC EAE GENE STL QL NAA+NON-PROBE: NOT DETECTED
ERYTHROCYTE [DISTWIDTH] IN BLOOD BY AUTOMATED COUNT: 15.3 % (ref 12.3–15.4)
ETEC LTA+ST1A+ST1B TOX ST NAA+NON-PROBE: NOT DETECTED
G LAMBLIA DNA STL QL NAA+NON-PROBE: NOT DETECTED
GLOBULIN UR ELPH-MCNC: 3.4 GM/DL
GLUCOSE SERPL-MCNC: 272 MG/DL (ref 65–99)
HCT VFR BLD AUTO: 38.3 % (ref 34–46.6)
HGB BLD-MCNC: 12.2 G/DL (ref 12–15.9)
IMM GRANULOCYTES # BLD AUTO: 0.07 10*3/MM3 (ref 0–0.05)
IMM GRANULOCYTES NFR BLD AUTO: 0.6 % (ref 0–0.5)
LYMPHOCYTES # BLD AUTO: 3.24 10*3/MM3 (ref 0.7–3.1)
LYMPHOCYTES NFR BLD AUTO: 25.5 % (ref 19.6–45.3)
MCH RBC QN AUTO: 29.5 PG (ref 26.6–33)
MCHC RBC AUTO-ENTMCNC: 31.9 G/DL (ref 31.5–35.7)
MCV RBC AUTO: 92.7 FL (ref 79–97)
MONOCYTES # BLD AUTO: 0.87 10*3/MM3 (ref 0.1–0.9)
MONOCYTES NFR BLD AUTO: 6.8 % (ref 5–12)
NEUTROPHILS NFR BLD AUTO: 65.6 % (ref 42.7–76)
NEUTROPHILS NFR BLD AUTO: 8.34 10*3/MM3 (ref 1.7–7)
NOROVIRUS GI+II RNA STL QL NAA+NON-PROBE: NOT DETECTED
NRBC BLD AUTO-RTO: 0 /100 WBC (ref 0–0.2)
P SHIGELLOIDES DNA STL QL NAA+NON-PROBE: NOT DETECTED
PLATELET # BLD AUTO: 144 10*3/MM3 (ref 140–450)
PMV BLD AUTO: 11.3 FL (ref 6–12)
POTASSIUM SERPL-SCNC: 3.5 MMOL/L (ref 3.5–5.2)
PROT SERPL-MCNC: 7.1 G/DL (ref 6–8.5)
RBC # BLD AUTO: 4.13 10*6/MM3 (ref 3.77–5.28)
RBC MORPH BLD: NORMAL
RVA RNA STL QL NAA+NON-PROBE: NOT DETECTED
S ENT+BONG DNA STL QL NAA+NON-PROBE: NOT DETECTED
SAPO I+II+IV+V RNA STL QL NAA+NON-PROBE: NOT DETECTED
SHIGELLA SP+EIEC IPAH ST NAA+NON-PROBE: NOT DETECTED
SODIUM SERPL-SCNC: 136 MMOL/L (ref 136–145)
V CHOL+PARA+VUL DNA STL QL NAA+NON-PROBE: NOT DETECTED
V CHOLERAE DNA STL QL NAA+NON-PROBE: NOT DETECTED
WBC NRBC COR # BLD AUTO: 12.71 10*3/MM3 (ref 3.4–10.8)
Y ENTEROCOL DNA STL QL NAA+NON-PROBE: NOT DETECTED

## 2025-07-30 PROCEDURE — 96361 HYDRATE IV INFUSION ADD-ON: CPT

## 2025-07-30 PROCEDURE — 85025 COMPLETE CBC W/AUTO DIFF WBC: CPT

## 2025-07-30 PROCEDURE — 96374 THER/PROPH/DIAG INJ IV PUSH: CPT

## 2025-07-30 PROCEDURE — 25810000003 SODIUM CHLORIDE 0.9 % SOLUTION: Performed by: NURSE PRACTITIONER

## 2025-07-30 PROCEDURE — 83735 ASSAY OF MAGNESIUM: CPT

## 2025-07-30 PROCEDURE — 87507 IADNA-DNA/RNA PROBE TQ 12-25: CPT | Performed by: INTERNAL MEDICINE

## 2025-07-30 PROCEDURE — 85007 BL SMEAR W/DIFF WBC COUNT: CPT

## 2025-07-30 PROCEDURE — 1160F RVW MEDS BY RX/DR IN RCRD: CPT | Performed by: NURSE PRACTITIONER

## 2025-07-30 PROCEDURE — 99214 OFFICE O/P EST MOD 30 MIN: CPT | Performed by: NURSE PRACTITIONER

## 2025-07-30 PROCEDURE — 96365 THER/PROPH/DIAG IV INF INIT: CPT

## 2025-07-30 PROCEDURE — 1125F AMNT PAIN NOTED PAIN PRSNT: CPT | Performed by: NURSE PRACTITIONER

## 2025-07-30 PROCEDURE — 1159F MED LIST DOCD IN RCRD: CPT | Performed by: NURSE PRACTITIONER

## 2025-07-30 PROCEDURE — 25010000002 ONDANSETRON PER 1 MG: Performed by: NURSE PRACTITIONER

## 2025-07-30 PROCEDURE — 80053 COMPREHEN METABOLIC PANEL: CPT

## 2025-07-30 RX ORDER — PYRIDOXINE HCL (VITAMIN B6) 50 MG
50 TABLET ORAL 3 TIMES DAILY
Qty: 90 TABLET | Refills: 2 | Status: SHIPPED | OUTPATIENT
Start: 2025-07-30

## 2025-07-30 RX ADMIN — ONDANSETRON 16 MG: 2 INJECTION INTRAMUSCULAR; INTRAVENOUS at 15:20

## 2025-07-30 RX ADMIN — SODIUM CHLORIDE 1000 ML: 9 INJECTION, SOLUTION INTRAVENOUS at 15:20

## 2025-07-30 NOTE — PROGRESS NOTES
"DATE:  7/30/2025    DIAGNOSIS: Intrahepatic Cholangiocarcinoma    CHIEF COMPLAINT:  \"Feet feels like she is walking in hot coals\", Nausea, Diarrhea        Interval History:  Ms. Santos follows with Dr. Lyn for intrahepatic cholangiocarcinoma and is being seen today for an acute visit. She is currently taking Xeloda and will complete current cycle tonight. She reports that yesterday the bottoms of her feet felt like she was walking on hot coals. She also reports a tingling sensation in both feet. She denies redness or peeling of skin. She continues to also struggle with nausea and diarrhea. She is only taking Phenergan for nausea but states that she has not took any today. She says Zofran does not work for her. She was also started on Marinol but she just started taking this on Monday. In regards to diarrhea she reports that this is ongoing and she had about 3-4 episodes yesterday and one today. She was able to collect stool studies today and return to lab. She reports a good appetite and hydrating well. She is without any other complaints at this time.     The following portions of the patient's history were reviewed and updated as appropriate: allergies, current medications, past family history, past medical history, past social history, past surgical history and problem list.    PAST MEDICAL HISTORY:  Past Medical History:   Diagnosis Date    Arthritis     Asthma     Celiac disease     Diabetes     Elevated cholesterol     Gastroparesis     GERD (gastroesophageal reflux disease)     History of transfusion     Hypertension     Kidney stones     Leaky heart valve     Migraines     PONV (postoperative nausea and vomiting)     Stomach ulcer     Stroke (cerebrum)        PAST SURGICAL HISTORY:  Past Surgical History:   Procedure Laterality Date    ABDOMINAL SURGERY      APPENDECTOMY      BLADDER SUSPENSION      sling and sling removal and mesh replaced(X3)    BRAVO PROCEDURE N/A 08/08/2018    Procedure: " ESOPHAGOGASTRODUODENOSCOPY AND CORTEZ;  Surgeon: Mele Rahman MD;  Location:  [facility];  Service: Gastroenterology    CHOLECYSTECTOMY      COLONOSCOPY N/A 08/08/2018    Procedure: COLONOSCOPY;  Surgeon: Mele Rahman MD;  Location:  [facility];  Service: Gastroenterology    ENDOSCOPY      FRACTURE SURGERY Left     quiana  in femur     HIATAL HERNIA REPAIR N/A 09/26/2018    Procedure: HIATAL HERNIA REPAIR LAPAROSCOPIC;  Surgeon: Mele Rahman MD;  Location:  [facility];  Service: General    HYSTERECTOMY  11 YEARS AGO    KIDNEY STONE SURGERY  01/2021    LEG SURGERY      LIVER SURGERY      Took out a piece of her liver, she has liver cancer    NISSEN FUNDOPLICATION N/A 09/26/2018    Procedure: NISSEN FUNDOPLICATION LAPAROSCOPIC;  Surgeon: Mele Rahman MD;  Location:  [facility];  Service: General    REPLACEMENT TOTAL KNEE Left 03/29/2023       SOCIAL HISTORY:  Social History     Socioeconomic History    Marital status:    Tobacco Use    Smoking status: Never     Passive exposure: Never    Smokeless tobacco: Never   Vaping Use    Vaping status: Never Used   Substance and Sexual Activity    Alcohol use: Yes     Comment: occ    Drug use: No    Sexual activity: Defer                FAMILY HISTORY:  Family History   Problem Relation Age of Onset    Hypertension Mother     Heart disease Mother     Cancer Mother     Diabetes Mother     Stroke Mother     Hypertension Father     Heart disease Father     Cancer Paternal Aunt     Cancer Maternal Grandmother     Diabetes Maternal Grandmother     Hypertension Maternal Grandmother          MEDICATIONS:  The current medication list was reviewed in the EMR    Current Outpatient Medications:     ondansetron (ZOFRAN) 8 MG tablet, Take 1 tablet by mouth 3 (Three) Times a Day As Needed for Nausea or Vomiting., Disp: 30 tablet, Rfl: 5    aspirin 81 MG EC tablet, Take 1 tablet by mouth Daily., Disp: , Rfl:     B-D UF III MINI PEN NEEDLES 31G X 5 MM misc, Use to inject insulin  four times daily, Disp: , Rfl:     capecitabine (Xeloda) 500 MG chemo tablet, Take 5 tablets by mouth 2 (Two) Times a Day on days 1-14, then off 7 days in each 21-day cycle.  Take within 30 minutes of a meal., Disp: 140 tablet, Rfl: 7    Continuous Glucose Sensor (Dexcom G7 Sensor) misc, Apply 1 each topically Every 10 (Ten) Days., Disp: , Rfl:     diazePAM (VALIUM) 10 MG tablet, Take 1 tablet by mouth Daily., Disp: , Rfl:     dronabinol (MARINOL) 5 MG capsule, Take 1 capsule by mouth 2 (Two) Times a Day Before Meals., Disp: 60 capsule, Rfl: 0    famotidine (Pepcid) 20 MG tablet, Take 1 tablet by mouth 2 (Two) Times a Day., Disp: 60 tablet, Rfl: 5    Farxiga 10 MG tablet, Take 10 mg by mouth Daily., Disp: , Rfl:     fluticasone (FLONASE) 50 MCG/ACT nasal spray, Administer 2 sprays into the nostril(s) as directed by provider Daily As Needed for Rhinitis., Disp: , Rfl:     gabapentin (NEURONTIN) 600 MG tablet, Take 1 tablet by mouth 3 (Three) Times a Day., Disp: , Rfl:     glucose (DEX4) 4 GM chewable tablet, Chew 4 tablets., Disp: , Rfl:     Insulin Glargine (Lantus SoloStar) 100 UNIT/ML injection pen, Inject 36 Units under the skin into the appropriate area as directed Every Morning., Disp: , Rfl:     Insulin Lispro, 1 Unit Dial, (HumaLOG KwikPen) 100 UNIT/ML solution pen-injector, Inject under the skin three times daily prior to meals per sliding scale, Disp: , Rfl:     losartan (COZAAR) 25 MG tablet, Take 1 tablet by mouth Daily., Disp: , Rfl:     montelukast (SINGULAIR) 10 MG tablet, Take 1 tablet by mouth Every Night., Disp: , Rfl:     naloxone (Narcan) 4 MG/0.1ML nasal spray, Administer 1 spray into the nostril(s) as directed by provider As Needed for Opioid Reversal. Call 911. Don't prime. [place] in 1 nostril for overdose. Repeat in 2-3 minutes in other nostril if no or minimal breathing/responsiveness., Disp: , Rfl:     oxyCODONE (ROXICODONE) 5 MG immediate release tablet, Take 1 tablet by mouth every night at  bedtime., Disp: , Rfl:     promethazine (PHENERGAN) 25 MG tablet, Take 1 tablet by mouth Every 8 (Eight) Hours As Needed., Disp: , Rfl:     pyridoxine (VITAMIN B-6) 50 MG tablet tablet, Take 1 tablet by mouth 3 times a day., Disp: 90 tablet, Rfl: 2    Rimegepant Sulfate (Nurtec) 75 MG tablet dispersible tablet, Take 1 tablet by mouth Daily. (Patient taking differently: Take 1 tablet by mouth Daily As Needed.), Disp: 2 tablet, Rfl: 0    rOPINIRole (REQUIP) 2 MG tablet, Take 1 tablet by mouth Every Night., Disp: , Rfl:     triamcinolone (KENALOG) 0.1 % cream, Apply topically to affected area as needed (Patient not taking: Reported on 7/30/2025), Disp: , Rfl:     Current Facility-Administered Medications:     OnabotulinumtoxinA 200 Units, 200 Units, Intramuscular, Q3 Months, Sonia Camacho, APRN, 165 Units at 07/16/25 1433    ALLERGIES:    Allergies   Allergen Reactions    Contrast Dye (Echo Or Unknown Ct/Mr) Anaphylaxis    Iodinated Contrast Media Anaphylaxis    Lyrica [Pregabalin] Anaphylaxis    Amoxicillin Nausea Only    Influenza Virus Vaccine Unknown - Low Severity     SITE SWELLING AND REDNESS    Zonisamide Unknown - High Severity         REVIEW OF SYSTEMS:    A comprehensive 14 point review of systems was performed.  Significant findings as mentioned above.  All other systems reviewed and are negative.        Physical Exam   Vital Signs:   Vitals:    07/30/25 1407   BP: 134/76   Pulse: 91   Resp: 18   Temp: 97.5 °F (36.4 °C)   SpO2: 96%         General: Well developed, well nourished, alert and oriented x 3, in no acute distress.   Head: ATNC   Eyes: PERRL, No evidence of conjunctivitis.   Nose: No nasal discharge.   Mouth: Oral mucosal membranes moist. No oral ulceration or hemorrhages.   Neck: Neck supple. No thyromegaly. No JVD.   Lungs: Clear in all fields to A&P without rales, rhonchi or wheezing.   Heart: S1, S2. Regular rate and rhythm. No murmurs, rubs, or gallops.   Abdomen: Soft. Bowel sounds  are normoactive. Nontender with palpation. No Hepatosplenomegaly can be appreciated.   Extremities: No cyanosis or edema. Peripheral pulses palpable and +2 bilaterally.   Integumentary: Warm, dry, intact. Bilateral feet and palms are warm and dry without erythema or desquamation.  Neurologic: Grossly non-focal exam.    Pain Score:  Pain Score    07/30/25 1407   PainSc: 10-Worst pain ever               IMAGING:  CT Abdomen Pelvis Without Contrast  Result Date: 7/10/2025  Narrative: EXAM:   CT Abdomen and Pelvis Without Intravenous Contrast  EXAM DATE:   7/10/2025 8:02 AM  CLINICAL HISTORY:   ABD PAIN; R10.84-Generalized abdominal pain  TECHNIQUE:   Axial computed tomography images of the abdomen and pelvis without intravenous contrast.  Sagittal and coronal reformatted images were created and reviewed.  This CT exam was performed using one or more of the following dose reduction techniques:  automated exposure control, adjustment of the mA and/or kV according to patient size, and/or use of iterative reconstruction technique.  COMPARISON:   10/31/2018  FINDINGS:   Lung bases:  Unremarkable as visualized.  No mass.  No consolidation.   ABDOMEN:   Liver:  Fatty infiltration of the liver with associated hepatomegaly.   Gallbladder and bile ducts:  Cholecystectomy clips.  No ductal dilation.   Pancreas:  Unremarkable as visualized.  No ductal dilation.   Spleen:  Unremarkable as visualized.  No splenomegaly.   Adrenals:  Unremarkable as visualized.  No mass.   Kidneys and ureters:  Unremarkable as visualized.  No obstructing stones.  No hydronephrosis.   Stomach and bowel:  Unremarkable as visualized.  No obstruction.  No mucosal thickening.   PELVIS:   Appendix:  No findings to suggest acute appendicitis.   Bladder:  Unremarkable as visualized.  No stones.   Reproductive:  Unremarkable as visualized.   ABDOMEN and PELVIS:   Intraperitoneal space:  Tiny amount of ascites around the liver.  No free air.   Bones/joints:  No  acute fracture.  No dislocation.   Soft tissues:  Unremarkable as visualized.   Vasculature:  Unremarkable as visualized.  No abdominal aortic aneurysm.   Lymph nodes:  Unremarkable as visualized.  No enlarged lymph nodes.      Impression: 1.  Tiny amount of ascites around the liver. 2.  Fatty infiltration of the liver with associated hepatomegaly.  This report was finalized on 7/10/2025 8:04 AM by Dr. Reggie Oden MD.           RECENT LABS:  Lab Results   Component Value Date    WBC 11.79 (H) 07/17/2025    HGB 12.1 07/17/2025    HCT 37.4 07/17/2025    MCV 87.4 07/17/2025    RDW 14.4 07/17/2025     07/17/2025    NEUTRORELPCT 59.4 07/17/2025    LYMPHORELPCT 29.1 07/17/2025    MONORELPCT 8.7 07/17/2025    EOSRELPCT 1.8 07/17/2025    BASORELPCT 0.4 07/17/2025    NEUTROABS 7.00 07/17/2025    LYMPHSABS 3.43 (H) 07/17/2025       Lab Results   Component Value Date     07/17/2025    K 3.1 (L) 07/17/2025    CO2 25.2 07/17/2025     07/17/2025    BUN 7.0 (L) 07/17/2025    CREATININE 0.64 07/17/2025    EGFRIFNONA 64 09/26/2018    GLUCOSE 179 (H) 07/17/2025    CALCIUM 9.2 07/17/2025    ALKPHOS 154 (H) 07/17/2025    AST 63 (H) 07/17/2025    ALT 19 07/17/2025    BILITOT 0.7 07/17/2025    ALBUMIN 3.9 07/17/2025    PROTEINTOT 7.3 07/17/2025    MG 1.4 (L) 07/17/2025    PHOS 2.6 07/17/2025       Lab Results   Component Value Date     01/10/2023       Lab Results   Component Value Date    [id number] 06/05/2025      ASSESSMENT & PLAN:  Esme Santos is a very pleasant 60 y.o. female with    1. Intrahepatic Cholangiocarcinoma  - Please see Dr. Lyn's most recent follow up note from 07/17/2025 for full details regarding oncology history. She is being seen today for an acute (see below). She will follow up as previously planned.     2. Nausea  3. Diarrhea  - She is taking Phenergan for nausea but states that she has not took any today. She says Zofran does not work for her. She was also started on  Marinol but she just started taking this on Monday. In regards to diarrhea she reports that this is ongoing and she had about 3-4 episodes yesterday and one today. She was able to collect stool studies today and return to lab. She reports a good appetite and hydrating well.  - Obtained CMP which showed normal creatinine, potassium and magnesium within normal.   - Stool studies were negative.   - Will give one liter NS and Zofran 16 IVPB today. She reported improvement in nausea after Zofran.  - Advised patient to continue Phenergan as  needed and recommended to take zofran 30 minutes prior to Xeloda.   - Advised to continue Imodium as needed.   - Encouraged to push oral hydration with water, Gatorade or Pedialyte.     4. Paraesthesias  - Likely secondary to Xeloda. Bilateral feet and palms are without redness or desquamation.   - Recommended ice bath for feet daily. Also advised to apply moisturizer such as Udder Cream, Cerave or Eucerin twice daily.  - RX provided today for Vitamin B6 50 mg TID  - Will continue to closely monitor. If symptoms do not improve may consider dose reduction of Xeloda.       I spent 30 minutes caring for Esme on this date of service. This time includes time spent by me in the following activities: preparing for the visit, reviewing tests, performing a medically appropriate examination and/or evaluation, counseling and educating the patient/family/caregiver, ordering medications, tests, or procedures, referring and communicating with other health care professionals, documenting information in the medical record, independently interpreting results and communicating that information with the patient/family/caregiver, and care coordination          Electronically Signed by: AROLDO Espana , AROLDO July 30, 2025 14:25 EDT       CC:   No ref. provider found  Niurka Guerrero MD

## 2025-07-31 DIAGNOSIS — E83.42 HYPOMAGNESEMIA: ICD-10-CM

## 2025-07-31 DIAGNOSIS — C22.1 INTRAHEPATIC CHOLANGIOCARCINOMA: Primary | ICD-10-CM

## 2025-07-31 LAB — MAGNESIUM SERPL-MCNC: 1.6 MG/DL (ref 1.6–2.4)

## 2025-08-06 ENCOUNTER — LAB (OUTPATIENT)
Dept: ONCOLOGY | Facility: CLINIC | Age: 60
End: 2025-08-06
Payer: MEDICAID

## 2025-08-06 ENCOUNTER — TELEPHONE (OUTPATIENT)
Dept: ONCOLOGY | Facility: HOSPITAL | Age: 60
End: 2025-08-06
Payer: MEDICAID

## 2025-08-06 ENCOUNTER — INFUSION (OUTPATIENT)
Dept: ONCOLOGY | Facility: HOSPITAL | Age: 60
End: 2025-08-06
Payer: MEDICAID

## 2025-08-06 ENCOUNTER — OFFICE VISIT (OUTPATIENT)
Dept: ONCOLOGY | Facility: CLINIC | Age: 60
End: 2025-08-06
Payer: MEDICAID

## 2025-08-06 VITALS
SYSTOLIC BLOOD PRESSURE: 141 MMHG | RESPIRATION RATE: 20 BRPM | OXYGEN SATURATION: 96 % | DIASTOLIC BLOOD PRESSURE: 73 MMHG | TEMPERATURE: 97.8 F | HEART RATE: 81 BPM

## 2025-08-06 VITALS
DIASTOLIC BLOOD PRESSURE: 79 MMHG | RESPIRATION RATE: 18 BRPM | WEIGHT: 175.2 LBS | OXYGEN SATURATION: 98 % | TEMPERATURE: 97.3 F | HEART RATE: 78 BPM | HEIGHT: 67 IN | SYSTOLIC BLOOD PRESSURE: 125 MMHG | BODY MASS INDEX: 27.5 KG/M2

## 2025-08-06 DIAGNOSIS — N39.0 URINARY TRACT INFECTION WITHOUT HEMATURIA, SITE UNSPECIFIED: ICD-10-CM

## 2025-08-06 DIAGNOSIS — C22.1 INTRAHEPATIC CHOLANGIOCARCINOMA: Primary | ICD-10-CM

## 2025-08-06 DIAGNOSIS — R19.7 DIARRHEA, UNSPECIFIED TYPE: ICD-10-CM

## 2025-08-06 DIAGNOSIS — R30.0 BURNING WITH URINATION: ICD-10-CM

## 2025-08-06 DIAGNOSIS — R73.9 HYPERGLYCEMIA: ICD-10-CM

## 2025-08-06 DIAGNOSIS — E83.42 HYPOMAGNESEMIA: ICD-10-CM

## 2025-08-06 DIAGNOSIS — C22.1 INTRAHEPATIC CHOLANGIOCARCINOMA: ICD-10-CM

## 2025-08-06 DIAGNOSIS — R11.0 NAUSEA: ICD-10-CM

## 2025-08-06 DIAGNOSIS — I87.8 POOR VENOUS ACCESS: ICD-10-CM

## 2025-08-06 DIAGNOSIS — R30.0 BURNING WITH URINATION: Primary | ICD-10-CM

## 2025-08-06 DIAGNOSIS — R63.0 APPETITE LOSS: ICD-10-CM

## 2025-08-06 DIAGNOSIS — E87.6 HYPOKALEMIA: ICD-10-CM

## 2025-08-06 LAB
ALBUMIN SERPL-MCNC: 4.1 G/DL (ref 3.5–5.2)
ALBUMIN/GLOB SERPL: 1.1 G/DL
ALP SERPL-CCNC: 162 U/L (ref 39–117)
ALT SERPL W P-5'-P-CCNC: 18 U/L (ref 1–33)
ANION GAP SERPL CALCULATED.3IONS-SCNC: 13.9 MMOL/L (ref 5–15)
AST SERPL-CCNC: 78 U/L (ref 1–32)
BACTERIA UR QL AUTO: ABNORMAL /HPF
BASOPHILS # BLD AUTO: 0.11 10*3/MM3 (ref 0–0.2)
BASOPHILS NFR BLD AUTO: 0.7 % (ref 0–1.5)
BILIRUB SERPL-MCNC: 1.2 MG/DL (ref 0–1.2)
BILIRUB UR QL STRIP: NEGATIVE
BUN SERPL-MCNC: 8.8 MG/DL (ref 8–23)
BUN/CREAT SERPL: 12.9 (ref 7–25)
CALCIUM SPEC-SCNC: 9.3 MG/DL (ref 8.6–10.5)
CHLORIDE SERPL-SCNC: 99 MMOL/L (ref 98–107)
CLARITY UR: ABNORMAL
CO2 SERPL-SCNC: 24.1 MMOL/L (ref 22–29)
COLOR UR: YELLOW
CREAT SERPL-MCNC: 0.68 MG/DL (ref 0.57–1)
DEPRECATED RDW RBC AUTO: 53.1 FL (ref 37–54)
EGFRCR SERPLBLD CKD-EPI 2021: 99.2 ML/MIN/1.73
EOSINOPHIL # BLD AUTO: 0.23 10*3/MM3 (ref 0–0.4)
EOSINOPHIL NFR BLD AUTO: 1.5 % (ref 0.3–6.2)
ERYTHROCYTE [DISTWIDTH] IN BLOOD BY AUTOMATED COUNT: 17.7 % (ref 12.3–15.4)
GLOBULIN UR ELPH-MCNC: 3.9 GM/DL
GLUCOSE SERPL-MCNC: 283 MG/DL (ref 65–99)
GLUCOSE UR STRIP-MCNC: ABNORMAL MG/DL
HCT VFR BLD AUTO: 41 % (ref 34–46.6)
HGB BLD-MCNC: 13.3 G/DL (ref 12–15.9)
HGB UR QL STRIP.AUTO: NEGATIVE
HYALINE CASTS UR QL AUTO: ABNORMAL /LPF
IMM GRANULOCYTES # BLD AUTO: 0.34 10*3/MM3 (ref 0–0.05)
IMM GRANULOCYTES NFR BLD AUTO: 2.2 % (ref 0–0.5)
IRON 24H UR-MRATE: 117 MCG/DL (ref 37–145)
IRON SATN MFR SERPL: 27 % (ref 20–50)
KETONES UR QL STRIP: ABNORMAL
LEUKOCYTE ESTERASE UR QL STRIP.AUTO: ABNORMAL
LYMPHOCYTES # BLD AUTO: 4.23 10*3/MM3 (ref 0.7–3.1)
LYMPHOCYTES NFR BLD AUTO: 27 % (ref 19.6–45.3)
MAGNESIUM SERPL-MCNC: 1.5 MG/DL (ref 1.6–2.4)
MCH RBC QN AUTO: 29.2 PG (ref 26.6–33)
MCHC RBC AUTO-ENTMCNC: 32.4 G/DL (ref 31.5–35.7)
MCV RBC AUTO: 90.1 FL (ref 79–97)
MONOCYTES # BLD AUTO: 1.15 10*3/MM3 (ref 0.1–0.9)
MONOCYTES NFR BLD AUTO: 7.3 % (ref 5–12)
NEUTROPHILS NFR BLD AUTO: 61.3 % (ref 42.7–76)
NEUTROPHILS NFR BLD AUTO: 9.59 10*3/MM3 (ref 1.7–7)
NITRITE UR QL STRIP: NEGATIVE
NRBC BLD AUTO-RTO: 0 /100 WBC (ref 0–0.2)
PH UR STRIP.AUTO: 6 [PH] (ref 5–8)
PHOSPHATE SERPL-MCNC: 3.6 MG/DL (ref 2.5–4.5)
PLATELET # BLD AUTO: 253 10*3/MM3 (ref 140–450)
PMV BLD AUTO: 9.9 FL (ref 6–12)
POTASSIUM SERPL-SCNC: 2.9 MMOL/L (ref 3.5–5.2)
PROT SERPL-MCNC: 8 G/DL (ref 6–8.5)
PROT UR QL STRIP: ABNORMAL
RBC # BLD AUTO: 4.55 10*6/MM3 (ref 3.77–5.28)
RBC # UR STRIP: ABNORMAL /HPF
REF LAB TEST METHOD: ABNORMAL
SODIUM SERPL-SCNC: 137 MMOL/L (ref 136–145)
SP GR UR STRIP: >1.03 (ref 1–1.03)
SQUAMOUS #/AREA URNS HPF: ABNORMAL /HPF
TIBC SERPL-MCNC: 434 MCG/DL (ref 298–536)
TRANSFERRIN SERPL-MCNC: 291 MG/DL (ref 200–360)
TSH SERPL DL<=0.05 MIU/L-ACNC: 2.8 UIU/ML (ref 0.27–4.2)
UROBILINOGEN UR QL STRIP: ABNORMAL
WBC # UR STRIP: ABNORMAL /HPF
WBC NRBC COR # BLD AUTO: 15.65 10*3/MM3 (ref 3.4–10.8)

## 2025-08-06 PROCEDURE — 84466 ASSAY OF TRANSFERRIN: CPT | Performed by: INTERNAL MEDICINE

## 2025-08-06 PROCEDURE — 25010000002 MAGNESIUM SULFATE 2 GM/50ML SOLUTION: Performed by: INTERNAL MEDICINE

## 2025-08-06 PROCEDURE — 25810000003 SODIUM CHLORIDE 0.9 % SOLUTION: Performed by: NURSE PRACTITIONER

## 2025-08-06 PROCEDURE — 25010000002 ONDANSETRON PER 1 MG: Performed by: INTERNAL MEDICINE

## 2025-08-06 PROCEDURE — 81001 URINALYSIS AUTO W/SCOPE: CPT | Performed by: INTERNAL MEDICINE

## 2025-08-06 PROCEDURE — 85025 COMPLETE CBC W/AUTO DIFF WBC: CPT | Performed by: INTERNAL MEDICINE

## 2025-08-06 PROCEDURE — 25010000002 POTASSIUM CHLORIDE 10 MEQ/100ML SOLUTION: Performed by: INTERNAL MEDICINE

## 2025-08-06 PROCEDURE — 83735 ASSAY OF MAGNESIUM: CPT | Performed by: INTERNAL MEDICINE

## 2025-08-06 PROCEDURE — 96366 THER/PROPH/DIAG IV INF ADDON: CPT

## 2025-08-06 PROCEDURE — 80053 COMPREHEN METABOLIC PANEL: CPT | Performed by: INTERNAL MEDICINE

## 2025-08-06 PROCEDURE — 83540 ASSAY OF IRON: CPT | Performed by: INTERNAL MEDICINE

## 2025-08-06 PROCEDURE — 84100 ASSAY OF PHOSPHORUS: CPT | Performed by: INTERNAL MEDICINE

## 2025-08-06 PROCEDURE — 96365 THER/PROPH/DIAG IV INF INIT: CPT

## 2025-08-06 PROCEDURE — 84443 ASSAY THYROID STIM HORMONE: CPT | Performed by: INTERNAL MEDICINE

## 2025-08-06 PROCEDURE — 25810000003 SODIUM CHLORIDE 0.9 % SOLUTION: Performed by: INTERNAL MEDICINE

## 2025-08-06 PROCEDURE — 96368 THER/DIAG CONCURRENT INF: CPT

## 2025-08-06 PROCEDURE — 87086 URINE CULTURE/COLONY COUNT: CPT | Performed by: INTERNAL MEDICINE

## 2025-08-06 RX ORDER — MAGNESIUM SULFATE HEPTAHYDRATE 40 MG/ML
2 INJECTION, SOLUTION INTRAVENOUS ONCE
Status: COMPLETED | OUTPATIENT
Start: 2025-08-06 | End: 2025-08-06

## 2025-08-06 RX ORDER — NITROFURANTOIN 25; 75 MG/1; MG/1
100 CAPSULE ORAL 2 TIMES DAILY
Qty: 14 CAPSULE | Refills: 0 | Status: SHIPPED | OUTPATIENT
Start: 2025-08-06 | End: 2025-08-13

## 2025-08-06 RX ORDER — PANTOPRAZOLE SODIUM 40 MG/1
FOR SUSPENSION ORAL
Qty: 30 EACH | Status: CANCELLED | OUTPATIENT
Start: 2025-08-06

## 2025-08-06 RX ORDER — SODIUM CHLORIDE 9 MG/ML
250 INJECTION, SOLUTION INTRAVENOUS CONTINUOUS
Status: ACTIVE | OUTPATIENT
Start: 2025-08-06 | End: 2025-08-06

## 2025-08-06 RX ORDER — FAMOTIDINE 20 MG/1
20 TABLET, FILM COATED ORAL 2 TIMES DAILY
Qty: 60 TABLET | Refills: 5 | Status: SHIPPED | OUTPATIENT
Start: 2025-08-06 | End: 2025-08-11

## 2025-08-06 RX ORDER — POTASSIUM CHLORIDE 7.45 MG/ML
10 INJECTION INTRAVENOUS
Status: COMPLETED | OUTPATIENT
Start: 2025-08-06 | End: 2025-08-06

## 2025-08-06 RX ADMIN — POTASSIUM CHLORIDE 10 MEQ: 7.46 INJECTION, SOLUTION INTRAVENOUS at 12:00

## 2025-08-06 RX ADMIN — POTASSIUM CHLORIDE 10 MEQ: 7.46 INJECTION, SOLUTION INTRAVENOUS at 11:01

## 2025-08-06 RX ADMIN — POTASSIUM CHLORIDE 10 MEQ: 7.46 INJECTION, SOLUTION INTRAVENOUS at 14:20

## 2025-08-06 RX ADMIN — SODIUM CHLORIDE 2000 ML: 9 INJECTION, SOLUTION INTRAVENOUS at 11:00

## 2025-08-06 RX ADMIN — SODIUM CHLORIDE 250 ML/HR: 9 INJECTION, SOLUTION INTRAVENOUS at 13:19

## 2025-08-06 RX ADMIN — ONDANSETRON 8 MG: 2 INJECTION INTRAMUSCULAR; INTRAVENOUS at 11:01

## 2025-08-06 RX ADMIN — POTASSIUM CHLORIDE 10 MEQ: 7.46 INJECTION, SOLUTION INTRAVENOUS at 13:13

## 2025-08-06 RX ADMIN — MAGNESIUM SULFATE HEPTAHYDRATE 2 G: 40 INJECTION, SOLUTION INTRAVENOUS at 11:01

## 2025-08-07 ENCOUNTER — SPECIALTY PHARMACY (OUTPATIENT)
Age: 60
End: 2025-08-07
Payer: MEDICAID

## 2025-08-07 ENCOUNTER — OFFICE VISIT (OUTPATIENT)
Age: 60
End: 2025-08-07
Payer: MEDICAID

## 2025-08-07 ENCOUNTER — TELEPHONE (OUTPATIENT)
Dept: CARDIOLOGY | Facility: HOSPITAL | Age: 60
End: 2025-08-07
Payer: MEDICAID

## 2025-08-07 VITALS
BODY MASS INDEX: 28.45 KG/M2 | SYSTOLIC BLOOD PRESSURE: 108 MMHG | HEART RATE: 78 BPM | OXYGEN SATURATION: 97 % | DIASTOLIC BLOOD PRESSURE: 76 MMHG | HEIGHT: 66 IN | WEIGHT: 177 LBS

## 2025-08-07 DIAGNOSIS — C22.1 INTRAHEPATIC CHOLANGIOCARCINOMA: Primary | ICD-10-CM

## 2025-08-07 DIAGNOSIS — G47.33 OSA TREATED WITH BIPAP: ICD-10-CM

## 2025-08-07 DIAGNOSIS — C22.1 INTRAHEPATIC CHOLANGIOCARCINOMA: ICD-10-CM

## 2025-08-07 DIAGNOSIS — J90 PLEURAL EFFUSION: ICD-10-CM

## 2025-08-07 DIAGNOSIS — G47.34 NOCTURNAL OXYGEN DESATURATION: Primary | ICD-10-CM

## 2025-08-07 LAB — BACTERIA SPEC AEROBE CULT: NORMAL

## 2025-08-07 RX ORDER — SUCRALFATE 1 G/1
1 TABLET ORAL 4 TIMES DAILY
Qty: 120 TABLET | Refills: 2 | Status: SHIPPED | OUTPATIENT
Start: 2025-08-07

## 2025-08-11 ENCOUNTER — OFFICE VISIT (OUTPATIENT)
Dept: SURGERY | Facility: CLINIC | Age: 60
End: 2025-08-11
Payer: MEDICAID

## 2025-08-11 VITALS
DIASTOLIC BLOOD PRESSURE: 64 MMHG | HEIGHT: 66 IN | WEIGHT: 181 LBS | SYSTOLIC BLOOD PRESSURE: 120 MMHG | BODY MASS INDEX: 29.09 KG/M2

## 2025-08-11 DIAGNOSIS — C22.1 INTRAHEPATIC CHOLANGIOCARCINOMA: ICD-10-CM

## 2025-08-11 DIAGNOSIS — I87.8 POOR VENOUS ACCESS: Primary | ICD-10-CM

## 2025-08-11 RX ORDER — SODIUM CHLORIDE 0.9 % (FLUSH) 0.9 %
10 SYRINGE (ML) INJECTION AS NEEDED
Status: CANCELLED | OUTPATIENT
Start: 2025-08-13

## 2025-08-11 RX ORDER — SODIUM CHLORIDE 0.9 % (FLUSH) 0.9 %
10 SYRINGE (ML) INJECTION EVERY 12 HOURS SCHEDULED
Status: CANCELLED | OUTPATIENT
Start: 2025-08-13

## 2025-08-11 RX ORDER — SODIUM CHLORIDE 9 MG/ML
40 INJECTION, SOLUTION INTRAVENOUS AS NEEDED
Status: CANCELLED | OUTPATIENT
Start: 2025-08-13

## 2025-08-12 ENCOUNTER — TELEPHONE (OUTPATIENT)
Dept: SURGERY | Facility: CLINIC | Age: 60
End: 2025-08-12
Payer: MEDICAID

## 2025-08-13 ENCOUNTER — HOSPITAL ENCOUNTER (OUTPATIENT)
Facility: HOSPITAL | Age: 60
Setting detail: HOSPITAL OUTPATIENT SURGERY
Discharge: HOME | End: 2025-08-13
Attending: SURGERY | Admitting: SURGERY
Payer: MEDICAID

## 2025-08-13 ENCOUNTER — ANESTHESIA (OUTPATIENT)
Dept: PERIOP | Facility: HOSPITAL | Age: 60
End: 2025-08-13
Payer: MEDICAID

## 2025-08-13 ENCOUNTER — LAB (OUTPATIENT)
Dept: ONCOLOGY | Facility: CLINIC | Age: 60
End: 2025-08-13
Payer: MEDICAID

## 2025-08-13 ENCOUNTER — ANESTHESIA EVENT (OUTPATIENT)
Dept: PERIOP | Facility: HOSPITAL | Age: 60
End: 2025-08-13
Payer: MEDICAID

## 2025-08-13 ENCOUNTER — APPOINTMENT (OUTPATIENT)
Dept: GENERAL RADIOLOGY | Facility: HOSPITAL | Age: 60
End: 2025-08-13
Payer: MEDICAID

## 2025-08-13 VITALS
HEART RATE: 77 BPM | DIASTOLIC BLOOD PRESSURE: 70 MMHG | WEIGHT: 175 LBS | TEMPERATURE: 98 F | RESPIRATION RATE: 18 BRPM | HEIGHT: 67 IN | OXYGEN SATURATION: 96 % | BODY MASS INDEX: 27.47 KG/M2 | SYSTOLIC BLOOD PRESSURE: 122 MMHG

## 2025-08-13 DIAGNOSIS — I87.8 POOR VENOUS ACCESS: ICD-10-CM

## 2025-08-13 DIAGNOSIS — C22.1 INTRAHEPATIC CHOLANGIOCARCINOMA: ICD-10-CM

## 2025-08-13 LAB — GLUCOSE BLDC GLUCOMTR-MCNC: 161 MG/DL (ref 70–130)

## 2025-08-13 PROCEDURE — 71045 X-RAY EXAM CHEST 1 VIEW: CPT | Performed by: RADIOLOGY

## 2025-08-13 PROCEDURE — 25010000002 CEFAZOLIN PER 500 MG: Performed by: SURGERY

## 2025-08-13 PROCEDURE — 25010000002 LIDOCAINE 1 % SOLUTION: Performed by: SURGERY

## 2025-08-13 PROCEDURE — 25010000002 FENTANYL CITRATE (PF) 50 MCG/ML SOLUTION: Performed by: NURSE ANESTHETIST, CERTIFIED REGISTERED

## 2025-08-13 PROCEDURE — 76000 FLUOROSCOPY <1 HR PHYS/QHP: CPT

## 2025-08-13 PROCEDURE — 25010000002 DEXAMETHASONE PER 1 MG: Performed by: NURSE ANESTHETIST, CERTIFIED REGISTERED

## 2025-08-13 PROCEDURE — C1788 PORT, INDWELLING, IMP: HCPCS | Performed by: SURGERY

## 2025-08-13 PROCEDURE — 77001 FLUOROGUIDE FOR VEIN DEVICE: CPT | Performed by: SURGERY

## 2025-08-13 PROCEDURE — 36561 INSERT TUNNELED CV CATH: CPT | Performed by: SURGERY

## 2025-08-13 PROCEDURE — 25010000002 FAMOTIDINE 10 MG/ML SOLUTION: Performed by: NURSE ANESTHETIST, CERTIFIED REGISTERED

## 2025-08-13 PROCEDURE — 71045 X-RAY EXAM CHEST 1 VIEW: CPT

## 2025-08-13 PROCEDURE — 25010000002 HEPARIN (PORCINE) PER 1000 UNITS: Performed by: SURGERY

## 2025-08-13 PROCEDURE — 25810000003 LACTATED RINGERS PER 1000 ML: Performed by: ANESTHESIOLOGY

## 2025-08-13 PROCEDURE — 25010000002 PROPOFOL 200 MG/20ML EMULSION: Performed by: NURSE ANESTHETIST, CERTIFIED REGISTERED

## 2025-08-13 PROCEDURE — C1769 GUIDE WIRE: HCPCS | Performed by: SURGERY

## 2025-08-13 PROCEDURE — 25010000002 LIDOCAINE PF 2% 2 % SOLUTION: Performed by: NURSE ANESTHETIST, CERTIFIED REGISTERED

## 2025-08-13 PROCEDURE — 25010000002 ONDANSETRON PER 1 MG: Performed by: NURSE ANESTHETIST, CERTIFIED REGISTERED

## 2025-08-13 PROCEDURE — 82948 REAGENT STRIP/BLOOD GLUCOSE: CPT | Performed by: ANESTHESIOLOGY

## 2025-08-13 DEVICE — POWERPORT CLEARVUE ISP IMPLANTABLE PORT WITH ATTACHABLE 8F POLYURETHANE OPEN-ENDED SINGLE-LUMEN VENOUS CATHETER PROCEDURAL KIT
Type: IMPLANTABLE DEVICE | Site: SUBCLAVIAN | Status: FUNCTIONAL
Brand: POWERPORT CLEARVUE

## 2025-08-13 RX ORDER — MIDAZOLAM HYDROCHLORIDE 1 MG/ML
1 INJECTION, SOLUTION INTRAMUSCULAR; INTRAVENOUS
Status: DISCONTINUED | OUTPATIENT
Start: 2025-08-13 | End: 2025-08-13 | Stop reason: HOSPADM

## 2025-08-13 RX ORDER — ONDANSETRON 2 MG/ML
INJECTION INTRAMUSCULAR; INTRAVENOUS AS NEEDED
Status: DISCONTINUED | OUTPATIENT
Start: 2025-08-13 | End: 2025-08-13 | Stop reason: SURG

## 2025-08-13 RX ORDER — OXYCODONE AND ACETAMINOPHEN 5; 325 MG/1; MG/1
1 TABLET ORAL ONCE AS NEEDED
Status: COMPLETED | OUTPATIENT
Start: 2025-08-13 | End: 2025-08-13

## 2025-08-13 RX ORDER — SODIUM CHLORIDE 0.9 % (FLUSH) 0.9 %
10 SYRINGE (ML) INJECTION AS NEEDED
Status: DISCONTINUED | OUTPATIENT
Start: 2025-08-13 | End: 2025-08-13 | Stop reason: HOSPADM

## 2025-08-13 RX ORDER — SODIUM CHLORIDE 9 MG/ML
40 INJECTION, SOLUTION INTRAVENOUS AS NEEDED
Status: DISCONTINUED | OUTPATIENT
Start: 2025-08-13 | End: 2025-08-13 | Stop reason: HOSPADM

## 2025-08-13 RX ORDER — DEXAMETHASONE SODIUM PHOSPHATE 4 MG/ML
INJECTION, SOLUTION INTRA-ARTICULAR; INTRALESIONAL; INTRAMUSCULAR; INTRAVENOUS; SOFT TISSUE AS NEEDED
Status: DISCONTINUED | OUTPATIENT
Start: 2025-08-13 | End: 2025-08-13 | Stop reason: SURG

## 2025-08-13 RX ORDER — HYDROCODONE BITARTRATE AND ACETAMINOPHEN 10; 325 MG/1; MG/1
1 TABLET ORAL 4 TIMES DAILY PRN
Qty: 8 TABLET | Refills: 0 | Status: SHIPPED | OUTPATIENT
Start: 2025-08-13

## 2025-08-13 RX ORDER — PROPOFOL 10 MG/ML
INJECTION, EMULSION INTRAVENOUS AS NEEDED
Status: DISCONTINUED | OUTPATIENT
Start: 2025-08-13 | End: 2025-08-13 | Stop reason: SURG

## 2025-08-13 RX ORDER — LIDOCAINE HYDROCHLORIDE 20 MG/ML
INJECTION, SOLUTION EPIDURAL; INFILTRATION; INTRACAUDAL; PERINEURAL AS NEEDED
Status: DISCONTINUED | OUTPATIENT
Start: 2025-08-13 | End: 2025-08-13 | Stop reason: SURG

## 2025-08-13 RX ORDER — MEPERIDINE HYDROCHLORIDE 25 MG/ML
12.5 INJECTION INTRAMUSCULAR; INTRAVENOUS; SUBCUTANEOUS
Status: DISCONTINUED | OUTPATIENT
Start: 2025-08-13 | End: 2025-08-13 | Stop reason: HOSPADM

## 2025-08-13 RX ORDER — SODIUM CHLORIDE 0.9 % (FLUSH) 0.9 %
10 SYRINGE (ML) INJECTION EVERY 12 HOURS SCHEDULED
Status: DISCONTINUED | OUTPATIENT
Start: 2025-08-13 | End: 2025-08-13 | Stop reason: HOSPADM

## 2025-08-13 RX ORDER — ONDANSETRON 2 MG/ML
4 INJECTION INTRAMUSCULAR; INTRAVENOUS AS NEEDED
Status: DISCONTINUED | OUTPATIENT
Start: 2025-08-13 | End: 2025-08-13 | Stop reason: HOSPADM

## 2025-08-13 RX ORDER — FENTANYL CITRATE 50 UG/ML
50 INJECTION, SOLUTION INTRAMUSCULAR; INTRAVENOUS
Status: DISCONTINUED | OUTPATIENT
Start: 2025-08-13 | End: 2025-08-13 | Stop reason: HOSPADM

## 2025-08-13 RX ORDER — SODIUM CHLORIDE, SODIUM LACTATE, POTASSIUM CHLORIDE, CALCIUM CHLORIDE 600; 310; 30; 20 MG/100ML; MG/100ML; MG/100ML; MG/100ML
125 INJECTION, SOLUTION INTRAVENOUS ONCE
Status: COMPLETED | OUTPATIENT
Start: 2025-08-13 | End: 2025-08-13

## 2025-08-13 RX ORDER — KETOROLAC TROMETHAMINE 30 MG/ML
30 INJECTION, SOLUTION INTRAMUSCULAR; INTRAVENOUS EVERY 6 HOURS PRN
Status: DISCONTINUED | OUTPATIENT
Start: 2025-08-13 | End: 2025-08-13 | Stop reason: HOSPADM

## 2025-08-13 RX ORDER — FAMOTIDINE 10 MG/ML
INJECTION, SOLUTION INTRAVENOUS AS NEEDED
Status: DISCONTINUED | OUTPATIENT
Start: 2025-08-13 | End: 2025-08-13 | Stop reason: SURG

## 2025-08-13 RX ORDER — LIDOCAINE HYDROCHLORIDE 10 MG/ML
INJECTION, SOLUTION INFILTRATION; PERINEURAL AS NEEDED
Status: DISCONTINUED | OUTPATIENT
Start: 2025-08-13 | End: 2025-08-13 | Stop reason: HOSPADM

## 2025-08-13 RX ORDER — FENTANYL CITRATE 50 UG/ML
INJECTION, SOLUTION INTRAMUSCULAR; INTRAVENOUS AS NEEDED
Status: DISCONTINUED | OUTPATIENT
Start: 2025-08-13 | End: 2025-08-13 | Stop reason: SURG

## 2025-08-13 RX ORDER — IPRATROPIUM BROMIDE AND ALBUTEROL SULFATE 2.5; .5 MG/3ML; MG/3ML
3 SOLUTION RESPIRATORY (INHALATION) ONCE AS NEEDED
Status: DISCONTINUED | OUTPATIENT
Start: 2025-08-13 | End: 2025-08-13 | Stop reason: HOSPADM

## 2025-08-13 RX ADMIN — CEFAZOLIN 2000 MG: 2 INJECTION, POWDER, FOR SOLUTION INTRAMUSCULAR; INTRAVENOUS at 09:15

## 2025-08-13 RX ADMIN — FENTANYL CITRATE 100 MCG: 50 INJECTION, SOLUTION INTRAMUSCULAR; INTRAVENOUS at 09:09

## 2025-08-13 RX ADMIN — ONDANSETRON 4 MG: 2 INJECTION, SOLUTION INTRAMUSCULAR; INTRAVENOUS at 09:09

## 2025-08-13 RX ADMIN — PROPOFOL 150 MG: 10 INJECTION, EMULSION INTRAVENOUS at 09:15

## 2025-08-13 RX ADMIN — FAMOTIDINE 20 MG: 10 INJECTION INTRAVENOUS at 09:09

## 2025-08-13 RX ADMIN — OXYCODONE AND ACETAMINOPHEN 1 TABLET: 5; 325 TABLET ORAL at 10:22

## 2025-08-13 RX ADMIN — DEXAMETHASONE SODIUM PHOSPHATE 4 MG: 4 INJECTION, SOLUTION INTRA-ARTICULAR; INTRALESIONAL; INTRAMUSCULAR; INTRAVENOUS; SOFT TISSUE at 09:15

## 2025-08-13 RX ADMIN — SODIUM CHLORIDE, POTASSIUM CHLORIDE, SODIUM LACTATE AND CALCIUM CHLORIDE 125 ML/HR: 600; 310; 30; 20 INJECTION, SOLUTION INTRAVENOUS at 08:32

## 2025-08-13 RX ADMIN — LIDOCAINE HYDROCHLORIDE 100 MG: 20 INJECTION, SOLUTION EPIDURAL; INFILTRATION; INTRACAUDAL; PERINEURAL at 09:15

## 2025-08-14 ENCOUNTER — INFUSION (OUTPATIENT)
Dept: ONCOLOGY | Facility: HOSPITAL | Age: 60
End: 2025-08-14
Payer: MEDICAID

## 2025-08-14 ENCOUNTER — OFFICE VISIT (OUTPATIENT)
Dept: ONCOLOGY | Facility: CLINIC | Age: 60
End: 2025-08-14
Payer: MEDICAID

## 2025-08-14 ENCOUNTER — LAB (OUTPATIENT)
Dept: ONCOLOGY | Facility: CLINIC | Age: 60
End: 2025-08-14
Payer: MEDICAID

## 2025-08-14 VITALS
RESPIRATION RATE: 18 BRPM | SYSTOLIC BLOOD PRESSURE: 135 MMHG | HEART RATE: 75 BPM | TEMPERATURE: 97.6 F | OXYGEN SATURATION: 97 % | DIASTOLIC BLOOD PRESSURE: 79 MMHG

## 2025-08-14 VITALS
TEMPERATURE: 97.1 F | HEART RATE: 78 BPM | HEIGHT: 67 IN | OXYGEN SATURATION: 99 % | BODY MASS INDEX: 28.82 KG/M2 | RESPIRATION RATE: 18 BRPM | SYSTOLIC BLOOD PRESSURE: 113 MMHG | DIASTOLIC BLOOD PRESSURE: 67 MMHG | WEIGHT: 183.6 LBS

## 2025-08-14 DIAGNOSIS — R19.7 DIARRHEA, UNSPECIFIED TYPE: ICD-10-CM

## 2025-08-14 DIAGNOSIS — E53.8 FOLATE DEFICIENCY: ICD-10-CM

## 2025-08-14 DIAGNOSIS — N39.0 URINARY TRACT INFECTION WITHOUT HEMATURIA, SITE UNSPECIFIED: ICD-10-CM

## 2025-08-14 DIAGNOSIS — C22.1 INTRAHEPATIC CHOLANGIOCARCINOMA: ICD-10-CM

## 2025-08-14 DIAGNOSIS — R76.0 LUPUS ANTICOAGULANT POSITIVE: ICD-10-CM

## 2025-08-14 DIAGNOSIS — R11.0 NAUSEA: ICD-10-CM

## 2025-08-14 DIAGNOSIS — R20.2 PARESTHESIAS: ICD-10-CM

## 2025-08-14 DIAGNOSIS — C22.1 INTRAHEPATIC CHOLANGIOCARCINOMA: Primary | ICD-10-CM

## 2025-08-14 DIAGNOSIS — Z95.828 PORT-A-CATH IN PLACE: Primary | ICD-10-CM

## 2025-08-14 DIAGNOSIS — R63.0 APPETITE LOSS: ICD-10-CM

## 2025-08-14 LAB
ALBUMIN SERPL-MCNC: 3.8 G/DL (ref 3.5–5.2)
ALBUMIN/GLOB SERPL: 1 G/DL
ALP SERPL-CCNC: 142 U/L (ref 39–117)
ALT SERPL W P-5'-P-CCNC: 11 U/L (ref 1–33)
ANION GAP SERPL CALCULATED.3IONS-SCNC: 12.6 MMOL/L (ref 5–15)
ANISOCYTOSIS BLD QL: ABNORMAL
AST SERPL-CCNC: 34 U/L (ref 1–32)
BILIRUB SERPL-MCNC: 0.7 MG/DL (ref 0–1.2)
BUN SERPL-MCNC: 19 MG/DL (ref 8–23)
BUN/CREAT SERPL: 29.7 (ref 7–25)
CALCIUM SPEC-SCNC: 8.8 MG/DL (ref 8.6–10.5)
CHLORIDE SERPL-SCNC: 103 MMOL/L (ref 98–107)
CO2 SERPL-SCNC: 21.4 MMOL/L (ref 22–29)
CREAT SERPL-MCNC: 0.64 MG/DL (ref 0.57–1)
DEPRECATED RDW RBC AUTO: 59.5 FL (ref 37–54)
EGFRCR SERPLBLD CKD-EPI 2021: 100.7 ML/MIN/1.73
ERYTHROCYTE [DISTWIDTH] IN BLOOD BY AUTOMATED COUNT: 17.9 % (ref 12.3–15.4)
GLOBULIN UR ELPH-MCNC: 3.7 GM/DL
GLUCOSE SERPL-MCNC: 229 MG/DL (ref 65–99)
HCT VFR BLD AUTO: 34.2 % (ref 34–46.6)
HGB BLD-MCNC: 11.2 G/DL (ref 12–15.9)
LYMPHOCYTES # BLD MANUAL: 5.44 10*3/MM3 (ref 0.7–3.1)
LYMPHOCYTES NFR BLD MANUAL: 5 % (ref 5–12)
MAGNESIUM SERPL-MCNC: 1.7 MG/DL (ref 1.6–2.4)
MCH RBC QN AUTO: 30.2 PG (ref 26.6–33)
MCHC RBC AUTO-ENTMCNC: 32.7 G/DL (ref 31.5–35.7)
MCV RBC AUTO: 92.2 FL (ref 79–97)
MONOCYTES # BLD: 0.7 10*3/MM3 (ref 0.1–0.9)
MYELOCYTES NFR BLD MANUAL: 1 % (ref 0–0)
NEUTROPHILS # BLD AUTO: 7.68 10*3/MM3 (ref 1.7–7)
NEUTROPHILS NFR BLD MANUAL: 55 % (ref 42.7–76)
PHOSPHATE SERPL-MCNC: 3.7 MG/DL (ref 2.5–4.5)
PLAT MORPH BLD: NORMAL
PLATELET # BLD AUTO: 163 10*3/MM3 (ref 140–450)
PMV BLD AUTO: 10.5 FL (ref 6–12)
POTASSIUM SERPL-SCNC: 3.1 MMOL/L (ref 3.5–5.2)
PROT SERPL-MCNC: 7.5 G/DL (ref 6–8.5)
RBC # BLD AUTO: 3.71 10*6/MM3 (ref 3.77–5.28)
SODIUM SERPL-SCNC: 137 MMOL/L (ref 136–145)
VARIANT LYMPHS NFR BLD MANUAL: 39 % (ref 19.6–45.3)
WBC NRBC COR # BLD AUTO: 13.96 10*3/MM3 (ref 3.4–10.8)

## 2025-08-14 PROCEDURE — 96361 HYDRATE IV INFUSION ADD-ON: CPT

## 2025-08-14 PROCEDURE — 85007 BL SMEAR W/DIFF WBC COUNT: CPT

## 2025-08-14 PROCEDURE — 25010000002 PROMETHAZINE PER 50 MG: Performed by: NURSE PRACTITIONER

## 2025-08-14 PROCEDURE — 83735 ASSAY OF MAGNESIUM: CPT

## 2025-08-14 PROCEDURE — 85025 COMPLETE CBC W/AUTO DIFF WBC: CPT

## 2025-08-14 PROCEDURE — 80053 COMPREHEN METABOLIC PANEL: CPT

## 2025-08-14 PROCEDURE — 84100 ASSAY OF PHOSPHORUS: CPT

## 2025-08-14 PROCEDURE — 25810000003 SODIUM CHLORIDE 0.9 % SOLUTION: Performed by: NURSE PRACTITIONER

## 2025-08-14 PROCEDURE — 96365 THER/PROPH/DIAG IV INF INIT: CPT

## 2025-08-14 PROCEDURE — 25010000002 HEPARIN LOCK FLUSH PER 10 UNITS

## 2025-08-14 RX ORDER — SODIUM CHLORIDE 0.9 % (FLUSH) 0.9 %
10 SYRINGE (ML) INJECTION AS NEEDED
Status: DISCONTINUED | OUTPATIENT
Start: 2025-08-14 | End: 2025-08-14 | Stop reason: HOSPADM

## 2025-08-14 RX ORDER — HEPARIN SODIUM (PORCINE) LOCK FLUSH IV SOLN 100 UNIT/ML 100 UNIT/ML
500 SOLUTION INTRAVENOUS AS NEEDED
Status: CANCELLED | OUTPATIENT
Start: 2025-08-14

## 2025-08-14 RX ORDER — SODIUM CHLORIDE 0.9 % (FLUSH) 0.9 %
10 SYRINGE (ML) INJECTION AS NEEDED
Status: CANCELLED | OUTPATIENT
Start: 2025-08-14

## 2025-08-14 RX ORDER — HEPARIN SODIUM (PORCINE) LOCK FLUSH IV SOLN 100 UNIT/ML 100 UNIT/ML
500 SOLUTION INTRAVENOUS AS NEEDED
Status: DISCONTINUED | OUTPATIENT
Start: 2025-08-14 | End: 2025-08-14 | Stop reason: HOSPADM

## 2025-08-14 RX ORDER — POTASSIUM CHLORIDE 1500 MG/1
40 TABLET, EXTENDED RELEASE ORAL EVERY 4 HOURS
Qty: 4 TABLET | Refills: 0 | Status: SHIPPED | OUTPATIENT
Start: 2025-08-14 | End: 2025-08-15

## 2025-08-14 RX ORDER — POTASSIUM CHLORIDE 1500 MG/1
40 TABLET, EXTENDED RELEASE ORAL ONCE
Status: COMPLETED | OUTPATIENT
Start: 2025-08-14 | End: 2025-08-14

## 2025-08-14 RX ADMIN — Medication 10 ML: at 15:52

## 2025-08-14 RX ADMIN — PROMETHAZINE HYDROCHLORIDE 12.5 MG: 25 INJECTION INTRAMUSCULAR; INTRAVENOUS at 14:30

## 2025-08-14 RX ADMIN — HEPARIN 500 UNITS: 100 SYRINGE at 15:52

## 2025-08-14 RX ADMIN — SODIUM CHLORIDE 1000 ML: 9 INJECTION, SOLUTION INTRAVENOUS at 14:30

## 2025-08-14 RX ADMIN — POTASSIUM CHLORIDE 40 MEQ: 1500 TABLET, EXTENDED RELEASE ORAL at 15:52

## 2025-08-18 ENCOUNTER — LAB (OUTPATIENT)
Dept: ONCOLOGY | Facility: CLINIC | Age: 60
End: 2025-08-18
Payer: MEDICAID

## 2025-08-18 ENCOUNTER — INFUSION (OUTPATIENT)
Dept: ONCOLOGY | Facility: HOSPITAL | Age: 60
End: 2025-08-18
Payer: MEDICAID

## 2025-08-18 ENCOUNTER — OFFICE VISIT (OUTPATIENT)
Dept: ONCOLOGY | Facility: CLINIC | Age: 60
End: 2025-08-18
Payer: MEDICAID

## 2025-08-18 VITALS
BODY MASS INDEX: 28.31 KG/M2 | RESPIRATION RATE: 18 BRPM | OXYGEN SATURATION: 94 % | WEIGHT: 180.4 LBS | SYSTOLIC BLOOD PRESSURE: 122 MMHG | HEIGHT: 67 IN | HEART RATE: 86 BPM | TEMPERATURE: 96.9 F | DIASTOLIC BLOOD PRESSURE: 79 MMHG

## 2025-08-18 VITALS
WEIGHT: 178.8 LBS | OXYGEN SATURATION: 95 % | RESPIRATION RATE: 18 BRPM | SYSTOLIC BLOOD PRESSURE: 126 MMHG | HEART RATE: 81 BPM | DIASTOLIC BLOOD PRESSURE: 74 MMHG | TEMPERATURE: 97.8 F | BODY MASS INDEX: 28 KG/M2

## 2025-08-18 DIAGNOSIS — C22.1 INTRAHEPATIC CHOLANGIOCARCINOMA: Primary | ICD-10-CM

## 2025-08-18 DIAGNOSIS — R11.0 NAUSEA: ICD-10-CM

## 2025-08-18 DIAGNOSIS — R19.7 DIARRHEA, UNSPECIFIED TYPE: ICD-10-CM

## 2025-08-18 DIAGNOSIS — Z95.828 PORT-A-CATH IN PLACE: ICD-10-CM

## 2025-08-18 DIAGNOSIS — R11.0 NAUSEA: Primary | ICD-10-CM

## 2025-08-18 DIAGNOSIS — E86.0 DEHYDRATION: ICD-10-CM

## 2025-08-18 DIAGNOSIS — C22.1 INTRAHEPATIC CHOLANGIOCARCINOMA: ICD-10-CM

## 2025-08-18 LAB
ALBUMIN SERPL-MCNC: 4 G/DL (ref 3.5–5.2)
ALBUMIN/GLOB SERPL: 1.1 G/DL
ALP SERPL-CCNC: 192 U/L (ref 39–117)
ALT SERPL W P-5'-P-CCNC: 19 U/L (ref 1–33)
ANION GAP SERPL CALCULATED.3IONS-SCNC: 14.6 MMOL/L (ref 5–15)
AST SERPL-CCNC: 74 U/L (ref 1–32)
BASOPHILS # BLD AUTO: 0.07 10*3/MM3 (ref 0–0.2)
BASOPHILS NFR BLD AUTO: 0.7 % (ref 0–1.5)
BILIRUB SERPL-MCNC: 1 MG/DL (ref 0–1.2)
BUN SERPL-MCNC: 10.2 MG/DL (ref 8–23)
BUN/CREAT SERPL: 14.6 (ref 7–25)
CALCIUM SPEC-SCNC: 9.1 MG/DL (ref 8.6–10.5)
CHLORIDE SERPL-SCNC: 97 MMOL/L (ref 98–107)
CO2 SERPL-SCNC: 22.4 MMOL/L (ref 22–29)
CREAT SERPL-MCNC: 0.7 MG/DL (ref 0.57–1)
DEPRECATED RDW RBC AUTO: 58.4 FL (ref 37–54)
EGFRCR SERPLBLD CKD-EPI 2021: 98.5 ML/MIN/1.73
EOSINOPHIL # BLD AUTO: 0.37 10*3/MM3 (ref 0–0.4)
EOSINOPHIL NFR BLD AUTO: 3.6 % (ref 0.3–6.2)
ERYTHROCYTE [DISTWIDTH] IN BLOOD BY AUTOMATED COUNT: 17.6 % (ref 12.3–15.4)
GLOBULIN UR ELPH-MCNC: 3.8 GM/DL
GLUCOSE SERPL-MCNC: 362 MG/DL (ref 65–99)
HCT VFR BLD AUTO: 40.1 % (ref 34–46.6)
HGB BLD-MCNC: 13 G/DL (ref 12–15.9)
IMM GRANULOCYTES # BLD AUTO: 0.15 10*3/MM3 (ref 0–0.05)
IMM GRANULOCYTES NFR BLD AUTO: 1.5 % (ref 0–0.5)
LYMPHOCYTES # BLD AUTO: 2.47 10*3/MM3 (ref 0.7–3.1)
LYMPHOCYTES NFR BLD AUTO: 24 % (ref 19.6–45.3)
MAGNESIUM SERPL-MCNC: 1.7 MG/DL (ref 1.6–2.4)
MCH RBC QN AUTO: 29.7 PG (ref 26.6–33)
MCHC RBC AUTO-ENTMCNC: 32.4 G/DL (ref 31.5–35.7)
MCV RBC AUTO: 91.6 FL (ref 79–97)
MONOCYTES # BLD AUTO: 0.91 10*3/MM3 (ref 0.1–0.9)
MONOCYTES NFR BLD AUTO: 8.8 % (ref 5–12)
NEUTROPHILS NFR BLD AUTO: 6.34 10*3/MM3 (ref 1.7–7)
NEUTROPHILS NFR BLD AUTO: 61.4 % (ref 42.7–76)
NRBC BLD AUTO-RTO: 0 /100 WBC (ref 0–0.2)
PLATELET # BLD AUTO: 145 10*3/MM3 (ref 140–450)
PMV BLD AUTO: 10.6 FL (ref 6–12)
POTASSIUM SERPL-SCNC: 3.2 MMOL/L (ref 3.5–5.2)
PROT SERPL-MCNC: 7.8 G/DL (ref 6–8.5)
RBC # BLD AUTO: 4.38 10*6/MM3 (ref 3.77–5.28)
SODIUM SERPL-SCNC: 134 MMOL/L (ref 136–145)
WBC NRBC COR # BLD AUTO: 10.31 10*3/MM3 (ref 3.4–10.8)

## 2025-08-18 PROCEDURE — 83735 ASSAY OF MAGNESIUM: CPT | Performed by: NURSE PRACTITIONER

## 2025-08-18 PROCEDURE — 96366 THER/PROPH/DIAG IV INF ADDON: CPT

## 2025-08-18 PROCEDURE — 96367 TX/PROPH/DG ADDL SEQ IV INF: CPT

## 2025-08-18 PROCEDURE — 25010000002 HEPARIN LOCK FLUSH PER 10 UNITS

## 2025-08-18 PROCEDURE — 25810000003 SODIUM CHLORIDE 0.9 % SOLUTION: Performed by: NURSE PRACTITIONER

## 2025-08-18 PROCEDURE — 25010000002 PROMETHAZINE PER 50 MG: Performed by: NURSE PRACTITIONER

## 2025-08-18 PROCEDURE — 80053 COMPREHEN METABOLIC PANEL: CPT | Performed by: NURSE PRACTITIONER

## 2025-08-18 PROCEDURE — 25010000002 POTASSIUM CHLORIDE 10 MEQ/100ML SOLUTION: Performed by: NURSE PRACTITIONER

## 2025-08-18 PROCEDURE — 85025 COMPLETE CBC W/AUTO DIFF WBC: CPT | Performed by: NURSE PRACTITIONER

## 2025-08-18 PROCEDURE — 96365 THER/PROPH/DIAG IV INF INIT: CPT

## 2025-08-18 RX ORDER — HEPARIN SODIUM (PORCINE) LOCK FLUSH IV SOLN 100 UNIT/ML 100 UNIT/ML
500 SOLUTION INTRAVENOUS AS NEEDED
OUTPATIENT
Start: 2025-08-18

## 2025-08-18 RX ORDER — SODIUM CHLORIDE 0.9 % (FLUSH) 0.9 %
10 SYRINGE (ML) INJECTION AS NEEDED
OUTPATIENT
Start: 2025-08-18

## 2025-08-18 RX ORDER — PANTOPRAZOLE SODIUM 40 MG/1
40 TABLET, DELAYED RELEASE ORAL DAILY
COMMUNITY
Start: 2025-06-26

## 2025-08-18 RX ORDER — SODIUM CHLORIDE 9 MG/ML
1000 INJECTION, SOLUTION INTRAVENOUS ONCE
Status: COMPLETED | OUTPATIENT
Start: 2025-08-18 | End: 2025-08-18

## 2025-08-18 RX ORDER — SODIUM CHLORIDE 0.9 % (FLUSH) 0.9 %
10 SYRINGE (ML) INJECTION AS NEEDED
Status: DISCONTINUED | OUTPATIENT
Start: 2025-08-18 | End: 2025-08-18 | Stop reason: HOSPADM

## 2025-08-18 RX ORDER — LANOLIN ALCOHOL/MO/W.PET/CERES
400 CREAM (GRAM) TOPICAL DAILY
COMMUNITY
Start: 2025-07-18

## 2025-08-18 RX ORDER — HEPARIN SODIUM (PORCINE) LOCK FLUSH IV SOLN 100 UNIT/ML 100 UNIT/ML
500 SOLUTION INTRAVENOUS AS NEEDED
Status: DISCONTINUED | OUTPATIENT
Start: 2025-08-18 | End: 2025-08-18 | Stop reason: HOSPADM

## 2025-08-18 RX ORDER — ONDANSETRON 4 MG/1
4 TABLET, ORALLY DISINTEGRATING ORAL EVERY 8 HOURS PRN
COMMUNITY
Start: 2025-05-24

## 2025-08-18 RX ORDER — POTASSIUM CHLORIDE 7.45 MG/ML
10 INJECTION INTRAVENOUS
Status: COMPLETED | OUTPATIENT
Start: 2025-08-18 | End: 2025-08-18

## 2025-08-18 RX ORDER — PROCHLORPERAZINE MALEATE 10 MG
10 TABLET ORAL EVERY 6 HOURS PRN
COMMUNITY

## 2025-08-18 RX ORDER — POTASSIUM CHLORIDE 1500 MG/1
20 TABLET, EXTENDED RELEASE ORAL ONCE
Status: DISCONTINUED | OUTPATIENT
Start: 2025-08-18 | End: 2025-08-18

## 2025-08-18 RX ORDER — POTASSIUM CHLORIDE 750 MG/1
10 TABLET, EXTENDED RELEASE ORAL 2 TIMES DAILY
Qty: 10 TABLET | Refills: 0 | Status: SHIPPED | OUTPATIENT
Start: 2025-08-18 | End: 2025-08-23

## 2025-08-18 RX ADMIN — SODIUM CHLORIDE 1000 ML: 9 INJECTION, SOLUTION INTRAVENOUS at 14:23

## 2025-08-18 RX ADMIN — PROMETHAZINE HYDROCHLORIDE 12.5 MG: 25 INJECTION INTRAMUSCULAR; INTRAVENOUS at 13:09

## 2025-08-18 RX ADMIN — POTASSIUM CHLORIDE 10 MEQ: 7.46 INJECTION, SOLUTION INTRAVENOUS at 13:55

## 2025-08-18 RX ADMIN — SODIUM CHLORIDE 1000 ML: 9 INJECTION, SOLUTION INTRAVENOUS at 13:08

## 2025-08-18 RX ADMIN — Medication 10 ML: at 16:00

## 2025-08-18 RX ADMIN — HEPARIN 500 UNITS: 100 SYRINGE at 16:00

## 2025-08-18 RX ADMIN — POTASSIUM CHLORIDE 10 MEQ: 7.46 INJECTION, SOLUTION INTRAVENOUS at 14:46

## 2025-08-20 ENCOUNTER — TELEPHONE (OUTPATIENT)
Dept: CARDIOLOGY | Facility: HOSPITAL | Age: 60
End: 2025-08-20
Payer: MEDICAID

## 2025-08-21 ENCOUNTER — SPECIALTY PHARMACY (OUTPATIENT)
Dept: CARDIOLOGY | Facility: HOSPITAL | Age: 60
End: 2025-08-21
Payer: MEDICAID

## 2025-08-21 DIAGNOSIS — C22.1 INTRAHEPATIC CHOLANGIOCARCINOMA: Primary | ICD-10-CM

## 2025-08-21 RX ORDER — CAPECITABINE 500 MG/1
2000 TABLET, FILM COATED ORAL 2 TIMES DAILY
Qty: 112 TABLET | Refills: 4 | Status: SHIPPED | OUTPATIENT
Start: 2025-08-21 | End: 2025-08-25 | Stop reason: SDUPTHER

## 2025-08-25 ENCOUNTER — SPECIALTY PHARMACY (OUTPATIENT)
Age: 60
End: 2025-08-25
Payer: MEDICAID

## 2025-08-25 DIAGNOSIS — C22.1 INTRAHEPATIC CHOLANGIOCARCINOMA: Primary | ICD-10-CM

## 2025-08-25 DIAGNOSIS — E53.8 FOLATE DEFICIENCY: ICD-10-CM

## 2025-08-25 RX ORDER — CAPECITABINE 500 MG/1
2000 TABLET, FILM COATED ORAL 2 TIMES DAILY
Qty: 112 TABLET | Refills: 4 | Status: SHIPPED | OUTPATIENT
Start: 2025-08-25 | End: 2025-08-26 | Stop reason: SDUPTHER

## 2025-08-26 ENCOUNTER — SPECIALTY PHARMACY (OUTPATIENT)
Facility: HOSPITAL | Age: 60
End: 2025-08-26
Payer: MEDICAID

## 2025-08-26 DIAGNOSIS — C22.1 INTRAHEPATIC CHOLANGIOCARCINOMA: ICD-10-CM

## 2025-08-26 DIAGNOSIS — C22.1 INTRAHEPATIC CHOLANGIOCARCINOMA: Primary | ICD-10-CM

## 2025-08-26 RX ORDER — CAPECITABINE 500 MG/1
2000 TABLET, FILM COATED ORAL 2 TIMES DAILY
Qty: 112 TABLET | Refills: 4 | Status: SHIPPED | OUTPATIENT
Start: 2025-08-26

## 2025-08-26 RX ORDER — DRONABINOL 5 MG/1
5 CAPSULE ORAL
Qty: 60 CAPSULE | Refills: 0 | Status: SHIPPED | OUTPATIENT
Start: 2025-08-26

## 2025-08-27 ENCOUNTER — LAB (OUTPATIENT)
Dept: ONCOLOGY | Facility: CLINIC | Age: 60
End: 2025-08-27
Payer: MEDICAID

## 2025-08-27 ENCOUNTER — OFFICE VISIT (OUTPATIENT)
Dept: ONCOLOGY | Facility: CLINIC | Age: 60
End: 2025-08-27
Payer: MEDICAID

## 2025-08-27 ENCOUNTER — HOSPITAL ENCOUNTER (EMERGENCY)
Facility: HOSPITAL | Age: 60
Discharge: HOME | End: 2025-08-27
Attending: STUDENT IN AN ORGANIZED HEALTH CARE EDUCATION/TRAINING PROGRAM | Admitting: STUDENT IN AN ORGANIZED HEALTH CARE EDUCATION/TRAINING PROGRAM
Payer: MEDICAID

## 2025-08-27 VITALS
BODY MASS INDEX: 28.66 KG/M2 | RESPIRATION RATE: 18 BRPM | SYSTOLIC BLOOD PRESSURE: 119 MMHG | HEART RATE: 87 BPM | HEIGHT: 67 IN | TEMPERATURE: 97.3 F | OXYGEN SATURATION: 97 % | DIASTOLIC BLOOD PRESSURE: 69 MMHG | WEIGHT: 182.6 LBS

## 2025-08-27 VITALS
SYSTOLIC BLOOD PRESSURE: 125 MMHG | OXYGEN SATURATION: 100 % | WEIGHT: 180 LBS | RESPIRATION RATE: 20 BRPM | TEMPERATURE: 98.8 F | DIASTOLIC BLOOD PRESSURE: 69 MMHG | HEIGHT: 67 IN | BODY MASS INDEX: 28.25 KG/M2 | HEART RATE: 68 BPM

## 2025-08-27 DIAGNOSIS — C22.1 CHOLANGIOCARCINOMA: ICD-10-CM

## 2025-08-27 DIAGNOSIS — C22.1 INTRAHEPATIC CHOLANGIOCARCINOMA: Primary | ICD-10-CM

## 2025-08-27 DIAGNOSIS — R19.7 DIARRHEA, UNSPECIFIED TYPE: ICD-10-CM

## 2025-08-27 DIAGNOSIS — E53.8 FOLATE DEFICIENCY: ICD-10-CM

## 2025-08-27 DIAGNOSIS — K21.9 GASTROESOPHAGEAL REFLUX DISEASE, UNSPECIFIED WHETHER ESOPHAGITIS PRESENT: ICD-10-CM

## 2025-08-27 DIAGNOSIS — C22.1 INTRAHEPATIC CHOLANGIOCARCINOMA: ICD-10-CM

## 2025-08-27 DIAGNOSIS — E13.10 DIABETIC KETOACIDOSIS WITHOUT COMA ASSOCIATED WITH OTHER SPECIFIED DIABETES MELLITUS: ICD-10-CM

## 2025-08-27 DIAGNOSIS — R73.9 HYPERGLYCEMIA: Primary | ICD-10-CM

## 2025-08-27 DIAGNOSIS — R11.0 NAUSEA: ICD-10-CM

## 2025-08-27 DIAGNOSIS — R20.2 PARESTHESIAS: ICD-10-CM

## 2025-08-27 LAB
ACETONE BLD QL: NEGATIVE
ALBUMIN SERPL-MCNC: 3.8 G/DL (ref 3.5–5.2)
ALBUMIN SERPL-MCNC: 3.8 G/DL (ref 3.5–5.2)
ALBUMIN/GLOB SERPL: 1.1 G/DL
ALBUMIN/GLOB SERPL: 1.1 G/DL
ALP SERPL-CCNC: 151 U/L (ref 39–117)
ALP SERPL-CCNC: 157 U/L (ref 39–117)
ALT SERPL W P-5'-P-CCNC: 23 U/L (ref 1–33)
ALT SERPL W P-5'-P-CCNC: 24 U/L (ref 1–33)
ANION GAP SERPL CALCULATED.3IONS-SCNC: 13.9 MMOL/L (ref 5–15)
ANION GAP SERPL CALCULATED.3IONS-SCNC: 15.2 MMOL/L (ref 5–15)
AST SERPL-CCNC: 74 U/L (ref 1–32)
AST SERPL-CCNC: 76 U/L (ref 1–32)
ATMOSPHERIC PRESS: 732 MMHG
BASE EXCESS BLDV CALC-SCNC: -4.4 MMOL/L (ref 0–2)
BASOPHILS # BLD AUTO: 0.06 10*3/MM3 (ref 0–0.2)
BASOPHILS NFR BLD AUTO: 0.6 % (ref 0–1.5)
BDY SITE: ABNORMAL
BILIRUB SERPL-MCNC: 0.4 MG/DL (ref 0–1.2)
BILIRUB SERPL-MCNC: 0.4 MG/DL (ref 0–1.2)
BUN SERPL-MCNC: 18.3 MG/DL (ref 8–23)
BUN SERPL-MCNC: 19 MG/DL (ref 8–23)
BUN/CREAT SERPL: 23.8 (ref 7–25)
BUN/CREAT SERPL: 24.1 (ref 7–25)
CALCIUM SPEC-SCNC: 8.8 MG/DL (ref 8.6–10.5)
CALCIUM SPEC-SCNC: 9.2 MG/DL (ref 8.6–10.5)
CHLORIDE SERPL-SCNC: 97 MMOL/L (ref 98–107)
CHLORIDE SERPL-SCNC: 99 MMOL/L (ref 98–107)
CO2 BLDA-SCNC: 22.3 MMOL/L (ref 22–33)
CO2 SERPL-SCNC: 17.1 MMOL/L (ref 22–29)
CO2 SERPL-SCNC: 17.8 MMOL/L (ref 22–29)
COHGB MFR BLD: 1.2 % (ref 0–5)
CREAT SERPL-MCNC: 0.77 MG/DL (ref 0.57–1)
CREAT SERPL-MCNC: 0.79 MG/DL (ref 0.57–1)
D-LACTATE SERPL-SCNC: 2.6 MMOL/L (ref 0.5–2)
DEPRECATED RDW RBC AUTO: 57 FL (ref 37–54)
EGFRCR SERPLBLD CKD-EPI 2021: 85.2 ML/MIN/1.73
EGFRCR SERPLBLD CKD-EPI 2021: 87.9 ML/MIN/1.73
EOSINOPHIL # BLD AUTO: 0.18 10*3/MM3 (ref 0–0.4)
EOSINOPHIL NFR BLD AUTO: 1.8 % (ref 0.3–6.2)
ERYTHROCYTE [DISTWIDTH] IN BLOOD BY AUTOMATED COUNT: 16.5 % (ref 12.3–15.4)
GAS FLOW AIRWAY: 3 LPM
GLOBULIN UR ELPH-MCNC: 3.5 GM/DL
GLOBULIN UR ELPH-MCNC: 3.6 GM/DL
GLUCOSE BLDC GLUCOMTR-MCNC: 309 MG/DL (ref 70–130)
GLUCOSE BLDC GLUCOMTR-MCNC: 402 MG/DL (ref 70–130)
GLUCOSE BLDC GLUCOMTR-MCNC: >599 MG/DL (ref 70–130)
GLUCOSE SERPL-MCNC: 650 MG/DL (ref 65–99)
GLUCOSE SERPL-MCNC: 660 MG/DL (ref 65–99)
HCO3 BLDV-SCNC: 21.1 MMOL/L (ref 22–28)
HCT VFR BLD AUTO: 36.1 % (ref 34–46.6)
HGB BLD-MCNC: 11.6 G/DL (ref 12–15.9)
HGB BLDA-MCNC: 11.8 G/DL (ref 13.5–17.5)
IMM GRANULOCYTES # BLD AUTO: 0.06 10*3/MM3 (ref 0–0.05)
IMM GRANULOCYTES NFR BLD AUTO: 0.6 % (ref 0–0.5)
INHALED O2 CONCENTRATION: 32 %
LYMPHOCYTES # BLD AUTO: 2.83 10*3/MM3 (ref 0.7–3.1)
LYMPHOCYTES NFR BLD AUTO: 28.5 % (ref 19.6–45.3)
Lab: ABNORMAL
MAGNESIUM SERPL-MCNC: 1.7 MG/DL (ref 1.6–2.4)
MCH RBC QN AUTO: 30.4 PG (ref 26.6–33)
MCHC RBC AUTO-ENTMCNC: 32.1 G/DL (ref 31.5–35.7)
MCV RBC AUTO: 94.8 FL (ref 79–97)
METHGB BLD QL: 0.3 % (ref 0–3)
MODALITY: ABNORMAL
MONOCYTES # BLD AUTO: 0.52 10*3/MM3 (ref 0.1–0.9)
MONOCYTES NFR BLD AUTO: 5.2 % (ref 5–12)
NEUTROPHILS NFR BLD AUTO: 6.28 10*3/MM3 (ref 1.7–7)
NEUTROPHILS NFR BLD AUTO: 63.3 % (ref 42.7–76)
NRBC BLD AUTO-RTO: 0 /100 WBC (ref 0–0.2)
OXYHGB MFR BLDV: 92.2 % (ref 45–75)
PCO2 BLDV: 39.3 MM HG (ref 41–51)
PH BLDV: 7.34 PH UNITS (ref 7.32–7.42)
PLATELET # BLD AUTO: 170 10*3/MM3 (ref 140–450)
PMV BLD AUTO: 10.8 FL (ref 6–12)
PO2 BLDV: 68.6 MM HG (ref 27–53)
POTASSIUM SERPL-SCNC: 4.2 MMOL/L (ref 3.5–5.2)
POTASSIUM SERPL-SCNC: 4.3 MMOL/L (ref 3.5–5.2)
PROT SERPL-MCNC: 7.3 G/DL (ref 6–8.5)
PROT SERPL-MCNC: 7.4 G/DL (ref 6–8.5)
RBC # BLD AUTO: 3.81 10*6/MM3 (ref 3.77–5.28)
SAO2 % BLDCOV: 93.6 % (ref 45–75)
SODIUM SERPL-SCNC: 130 MMOL/L (ref 136–145)
SODIUM SERPL-SCNC: 130 MMOL/L (ref 136–145)
WBC NRBC COR # BLD AUTO: 9.93 10*3/MM3 (ref 3.4–10.8)

## 2025-08-27 PROCEDURE — 63710000001 INSULIN GLARGINE PER 5 UNITS: Performed by: STUDENT IN AN ORGANIZED HEALTH CARE EDUCATION/TRAINING PROGRAM

## 2025-08-27 PROCEDURE — 83735 ASSAY OF MAGNESIUM: CPT | Performed by: INTERNAL MEDICINE

## 2025-08-27 PROCEDURE — 80053 COMPREHEN METABOLIC PANEL: CPT | Performed by: INTERNAL MEDICINE

## 2025-08-27 PROCEDURE — 83605 ASSAY OF LACTIC ACID: CPT | Performed by: STUDENT IN AN ORGANIZED HEALTH CARE EDUCATION/TRAINING PROGRAM

## 2025-08-27 PROCEDURE — 63710000001 INSULIN REGULAR HUMAN PER 5 UNITS: Performed by: STUDENT IN AN ORGANIZED HEALTH CARE EDUCATION/TRAINING PROGRAM

## 2025-08-27 PROCEDURE — 96374 THER/PROPH/DIAG INJ IV PUSH: CPT

## 2025-08-27 PROCEDURE — 96361 HYDRATE IV INFUSION ADD-ON: CPT

## 2025-08-27 PROCEDURE — 25010000002 PROCHLORPERAZINE 10 MG/2ML SOLUTION: Performed by: STUDENT IN AN ORGANIZED HEALTH CARE EDUCATION/TRAINING PROGRAM

## 2025-08-27 PROCEDURE — 25810000003 SODIUM CHLORIDE 0.9 % SOLUTION: Performed by: STUDENT IN AN ORGANIZED HEALTH CARE EDUCATION/TRAINING PROGRAM

## 2025-08-27 PROCEDURE — 82009 KETONE BODYS QUAL: CPT | Performed by: STUDENT IN AN ORGANIZED HEALTH CARE EDUCATION/TRAINING PROGRAM

## 2025-08-27 PROCEDURE — 82607 VITAMIN B-12: CPT | Performed by: INTERNAL MEDICINE

## 2025-08-27 PROCEDURE — 85025 COMPLETE CBC W/AUTO DIFF WBC: CPT | Performed by: INTERNAL MEDICINE

## 2025-08-27 PROCEDURE — 82805 BLOOD GASES W/O2 SATURATION: CPT

## 2025-08-27 PROCEDURE — 82820 HEMOGLOBIN-OXYGEN AFFINITY: CPT

## 2025-08-27 PROCEDURE — 82948 REAGENT STRIP/BLOOD GLUCOSE: CPT

## 2025-08-27 PROCEDURE — 99283 EMERGENCY DEPT VISIT LOW MDM: CPT

## 2025-08-27 PROCEDURE — 80053 COMPREHEN METABOLIC PANEL: CPT | Performed by: STUDENT IN AN ORGANIZED HEALTH CARE EDUCATION/TRAINING PROGRAM

## 2025-08-27 PROCEDURE — 82746 ASSAY OF FOLIC ACID SERUM: CPT | Performed by: INTERNAL MEDICINE

## 2025-08-27 RX ORDER — PROCHLORPERAZINE EDISYLATE 5 MG/ML
10 INJECTION INTRAMUSCULAR; INTRAVENOUS ONCE
Status: COMPLETED | OUTPATIENT
Start: 2025-08-27 | End: 2025-08-27

## 2025-08-27 RX ORDER — DIPHENHYDRAMINE HCL 50 MG
CAPSULE ORAL
Qty: 1 CAPSULE | Refills: 0 | Status: SHIPPED | OUTPATIENT
Start: 2025-08-27

## 2025-08-27 RX ORDER — PREDNISONE 50 MG/1
TABLET ORAL
Qty: 3 TABLET | Refills: 0 | Status: SHIPPED | OUTPATIENT
Start: 2025-08-27

## 2025-08-27 RX ORDER — METOCLOPRAMIDE 10 MG/1
10 TABLET ORAL
Qty: 120 TABLET | Refills: 0 | Status: SHIPPED | OUTPATIENT
Start: 2025-08-27

## 2025-08-27 RX ORDER — PANTOPRAZOLE SODIUM 40 MG/1
40 TABLET, DELAYED RELEASE ORAL 2 TIMES DAILY
Qty: 60 TABLET | Refills: 0 | Status: SHIPPED | OUTPATIENT
Start: 2025-08-27 | End: 2025-09-26

## 2025-08-27 RX ADMIN — PROCHLORPERAZINE EDISYLATE 10 MG: 5 INJECTION INTRAMUSCULAR; INTRAVENOUS at 18:03

## 2025-08-27 RX ADMIN — INSULIN GLARGINE 30 UNITS: 100 INJECTION, SOLUTION SUBCUTANEOUS at 16:09

## 2025-08-27 RX ADMIN — SODIUM CHLORIDE 3000 ML: 9 INJECTION, SOLUTION INTRAVENOUS at 15:59

## 2025-08-27 RX ADMIN — INSULIN HUMAN 10 UNITS: 100 INJECTION, SOLUTION PARENTERAL at 16:00

## 2025-08-28 LAB
FOLATE SERPL-MCNC: 10.9 NG/ML (ref 4.78–24.2)
VIT B12 BLD-MCNC: 547 PG/ML (ref 211–946)

## (undated) DEVICE — DRAPE,T,LAPARO,TRANS,STERILE: Brand: MEDLINE

## (undated) DEVICE — PICC LINE DRESSING CHANGE TRAY: Brand: MEDLINE

## (undated) DEVICE — CATH IV JELCO 18GA 10 1 3/4 IN

## (undated) DEVICE — SUT PROLN 3/0 8832H

## (undated) DEVICE — GLV SURG PREMIERPRO MIC LTX PF SZ7 BRN

## (undated) DEVICE — ANTIBACTERIAL UNDYED BRAIDED (POLYGLACTIN 910), SYNTHETIC ABSORBABLE SUTURE: Brand: COATED VICRYL

## (undated) DEVICE — HOLDER: Brand: DEROYAL

## (undated) DEVICE — DRAPE,UTILTY,TAPE,15X26, 4EA/PK: Brand: MEDLINE

## (undated) DEVICE — Device: Brand: CENTURION

## (undated) DEVICE — SYR LUERLOK 5CC

## (undated) DEVICE — PATIENT RETURN ELECTRODE, SINGLE-USE, CONTACT QUALITY MONITORING, ADULT, WITH 9FT CORD, FOR PATIENTS WEIGING OVER 33LBS. (15KG): Brand: MEGADYNE

## (undated) DEVICE — SUT MNCRYL PLS ANTIB UD 4/0 PS2 18IN

## (undated) DEVICE — DECANTER BAG 9": Brand: MEDLINE INDUSTRIES, INC.

## (undated) DEVICE — PK BASIC 70

## (undated) DEVICE — GAUZE,BORDERED,2" X 2"(1" X 1"PAD)STRL: Brand: MEDLINE

## (undated) DEVICE — SYR LUERLOK 30CC

## (undated) DEVICE — DRP C/ARM W/BAND W/CLIPS 41X74IN

## (undated) DEVICE — BANDAGE,ADHESVE,FABRIC,2"X4",ST,LF,50/BX: Brand: CURAD